# Patient Record
Sex: MALE | Race: WHITE | URBAN - METROPOLITAN AREA
[De-identification: names, ages, dates, MRNs, and addresses within clinical notes are randomized per-mention and may not be internally consistent; named-entity substitution may affect disease eponyms.]

---

## 2017-04-26 ENCOUNTER — TELEPHONE (OUTPATIENT)
Dept: FAMILY MEDICINE | Facility: CLINIC | Age: 27
End: 2017-04-26

## 2017-04-26 ENCOUNTER — OFFICE VISIT (OUTPATIENT)
Dept: FAMILY MEDICINE | Facility: CLINIC | Age: 27
End: 2017-04-26
Payer: COMMERCIAL

## 2017-04-26 VITALS
SYSTOLIC BLOOD PRESSURE: 120 MMHG | WEIGHT: 177 LBS | TEMPERATURE: 97.4 F | BODY MASS INDEX: 24.78 KG/M2 | HEART RATE: 70 BPM | DIASTOLIC BLOOD PRESSURE: 80 MMHG | OXYGEN SATURATION: 98 % | HEIGHT: 71 IN

## 2017-04-26 DIAGNOSIS — J45.20 REACTIVE AIRWAY DISEASE, MILD INTERMITTENT, UNCOMPLICATED: ICD-10-CM

## 2017-04-26 DIAGNOSIS — R30.0 DYSURIA: Primary | ICD-10-CM

## 2017-04-26 LAB
ALBUMIN UR-MCNC: ABNORMAL MG/DL
AMORPH CRY #/AREA URNS HPF: ABNORMAL /HPF
APPEARANCE UR: CLEAR
BILIRUB UR QL STRIP: NEGATIVE
COLOR UR AUTO: YELLOW
GLUCOSE UR STRIP-MCNC: NEGATIVE MG/DL
HGB UR QL STRIP: NEGATIVE
KETONES UR STRIP-MCNC: NEGATIVE MG/DL
LEUKOCYTE ESTERASE UR QL STRIP: NEGATIVE
MUCOUS THREADS #/AREA URNS LPF: PRESENT /LPF
NITRATE UR QL: NEGATIVE
NON-SQ EPI CELLS #/AREA URNS LPF: ABNORMAL /LPF
PH UR STRIP: 6 PH (ref 5–7)
RBC #/AREA URNS AUTO: ABNORMAL /HPF (ref 0–2)
SP GR UR STRIP: 1.02 (ref 1–1.03)
URN SPEC COLLECT METH UR: ABNORMAL
UROBILINOGEN UR STRIP-ACNC: 0.2 EU/DL (ref 0.2–1)
WBC #/AREA URNS AUTO: ABNORMAL /HPF (ref 0–2)

## 2017-04-26 PROCEDURE — 99213 OFFICE O/P EST LOW 20 MIN: CPT | Performed by: FAMILY MEDICINE

## 2017-04-26 PROCEDURE — 87591 N.GONORRHOEAE DNA AMP PROB: CPT | Performed by: FAMILY MEDICINE

## 2017-04-26 PROCEDURE — 87086 URINE CULTURE/COLONY COUNT: CPT | Performed by: FAMILY MEDICINE

## 2017-04-26 PROCEDURE — 81001 URINALYSIS AUTO W/SCOPE: CPT | Performed by: FAMILY MEDICINE

## 2017-04-26 PROCEDURE — 87491 CHLMYD TRACH DNA AMP PROBE: CPT | Performed by: FAMILY MEDICINE

## 2017-04-26 RX ORDER — ALBUTEROL SULFATE 90 UG/1
2 AEROSOL, METERED RESPIRATORY (INHALATION) EVERY 6 HOURS PRN
Qty: 1 INHALER | Refills: 3 | Status: SHIPPED | OUTPATIENT
Start: 2017-04-26

## 2017-04-26 ASSESSMENT — ASTHMA QUESTIONNAIRES
QUESTION_1 LAST FOUR WEEKS HOW MUCH OF THE TIME DID YOUR ASTHMA KEEP YOU FROM GETTING AS MUCH DONE AT WORK, SCHOOL OR AT HOME: A LITTLE OF THE TIME
ACT_TOTALSCORE: 21
QUESTION_4 LAST FOUR WEEKS HOW OFTEN HAVE YOU USED YOUR RESCUE INHALER OR NEBULIZER MEDICATION (SUCH AS ALBUTEROL): ONCE A WEEK OR LESS
QUESTION_2 LAST FOUR WEEKS HOW OFTEN HAVE YOU HAD SHORTNESS OF BREATH: ONCE OR TWICE A WEEK
QUESTION_5 LAST FOUR WEEKS HOW WOULD YOU RATE YOUR ASTHMA CONTROL: WELL CONTROLLED
QUESTION_3 LAST FOUR WEEKS HOW OFTEN DID YOUR ASTHMA SYMPTOMS (WHEEZING, COUGHING, SHORTNESS OF BREATH, CHEST TIGHTNESS OR PAIN) WAKE YOU UP AT NIGHT OR EARLIER THAN USUAL IN THE MORNING: NOT AT ALL

## 2017-04-26 NOTE — TELEPHONE ENCOUNTER
patient calling to get test results- states that he was told they would be back in one hour- advised that the dipstick and mirco were available but the gc/chlamydia and the UC were not available for 2 days. Advised that we will call and results will be sent to [facility] if normal-  Gave patient his login for [facility] [place]  [facility]  [phone]

## 2017-04-26 NOTE — MR AVS SNAPSHOT
"              After Visit Summary   4/26/2017    Matthew Bone    MRN: [id number]           Patient Information     Date Of Birth          1990        Visit Information        Provider Department      4/26/2017 11:40 AM Mary Reyes MD [facility] [place]        Today's Diagnoses     Dysuria    -  1    Reactive airway disease, mild intermittent, uncomplicated           Follow-ups after your visit        Who to contact     If you have questions or need follow up information about today's clinic visit or your schedule please contact [facility] [place] directly at [phone].  Normal or non-critical lab and imaging results will be communicated to you by [employer], letter or phone within 4 business days after the clinic has received the results. If you do not hear from us within 7 days, please contact the clinic through ScanSocialt or phone. If you have a critical or abnormal lab result, we will notify you by phone as soon as possible.  Submit refill requests through [employer] or call your pharmacy and they will forward the refill request to us. Please allow 3 business days for your refill to be completed.          Additional Information About Your Visit        MyChart Information     [employer] gives you secure access to your electronic health record. If you see a primary care provider, you can also send messages to your care team and make appointments. If you have questions, please call your primary care clinic.  If you do not have a primary care provider, please call [phone] and they will assist you.        Care EveryWhere ID     This is your Care EveryWhere ID. This could be used by other organizations to access your [place] medical records  [id number]        Your Vitals Were     Pulse Temperature Height Pulse Oximetry BMI (Body Mass Index)       70 97.4  F (36.3  C) (Tympanic) 5' 11\" (1.803 m) 98% 24.69 kg/m2        Blood Pressure from Last 3 Encounters:   04/26/17 120/80   12/19/16 110/70 "   10/11/16 106/68    Weight from Last 3 Encounters:   04/26/17 177 lb (80.3 kg)   12/19/16 188 lb (85.3 kg)   10/11/16 188 lb 9.6 oz (85.5 kg)              We Performed the Following     *UA reflex to Microscopic and Culture ([place] and [facility] (except [place] and [place])     CHLAMYDIA TRACHOMATIS PCR     NEISSERIA GONORRHOEA PCR     Urine Culture Aerobic Bacterial     Urine Microscopic          Today's Medication Changes          These changes are accurate as of: 4/26/17 11:59 PM.  If you have any questions, ask your nurse or doctor.               These medicines have changed or have updated prescriptions.        Dose/Directions    * albuterol (5 MG/ML) 0.5% neb solution   Commonly known as:  PROVENTIL   This may have changed:  Another medication with the same name was changed. Make sure you understand how and when to take each.   Changed by:  Mary Reyes MD        Dose:  2.5 mg   Inhale 2.5 mg into the lungs every 6 hours as needed for wheezing or shortness of breath / dyspnea   Refills:  0       * VENTOLIN  (90 BASE) MCG/ACT Inhaler   This may have changed:    - how much to take  - how to take this  - when to take this  - reasons to take this   Used for:  Reactive airway disease, mild intermittent, uncomplicated   Generic drug:  albuterol   Changed by:  Mary Reyes MD        Dose:  2 puff   Inhale 2 puffs into the lungs every 6 hours as needed for shortness of breath / dyspnea or wheezing   Quantity:  1 Inhaler   Refills:  3       * Notice:  This list has 2 medication(s) that are the same as other medications prescribed for you. Read the directions carefully, and ask your doctor or other care provider to review them with you.         Where to get your medicines      These medications were sent to [facility] [id number] [facility] [place] - [address]  [address] [facility] [address]     Phone:  [phone]     VENTOLIN  (90 BASE) MCG/ACT Inhaler                 Primary Care Provider Office Phone # Fax #    Mary Reyes [phone]       [facility] [place] [address] [place] [address] [address]  [place] [address]        Thank you!     Thank you for choosing [facility]  for your care. Our goal is always to provide you with excellent care. Hearing back from our patients is one way we can continue to improve our services. Please take a few minutes to complete the written survey that you may receive in the mail after your visit with us. Thank you!             Your Updated Medication List - Protect others around you: Learn how to safely use, store and throw away your medicines at www.disposemymeds.org.          This list is accurate as of: 4/26/17 11:59 PM.  Always use your most recent med list.                   Brand Name Dispense Instructions for use    * albuterol (5 MG/ML) 0.5% neb solution    PROVENTIL     Inhale 2.5 mg into the lungs every 6 hours as needed for wheezing or shortness of breath / dyspnea       * VENTOLIN  (90 BASE) MCG/ACT Inhaler   Generic drug:  albuterol     1 Inhaler    Inhale 2 puffs into the lungs every 6 hours as needed for shortness of breath / dyspnea or wheezing       COLACE PO      Take 100 mg by mouth once as needed       fluticasone 50 MCG/ACT spray    FLONASE     Spray 1 spray into both nostrils daily       hydrOXYzine 25 MG tablet    ATARAX    30 tablet    Take 1-2 tablets (25-50 mg) by mouth every 8 hours as needed for anxiety       IBUPROFEN PO          * Notice:  This list has 2 medication(s) that are the same as other medications prescribed for you. Read the directions carefully, and ask your doctor or other care provider to review them with you.

## 2017-04-26 NOTE — PROGRESS NOTES
SUBJECTIVE:                                                    Matthew Bone is a 26 year old male who presents to clinic today for the following health issues:      Genitourinary - Male     Onset: Saturday     Description:   Dysuria (painful urination): no   Hematuria (blood in urine): no   Frequency: no   Are you urinating at night : no   Hesitancy (delay in urine): YES- sometimes   Retention (unable to empty): no   Decrease in urinary flow: no   Incontinence: no     Progression of Symptoms:  improving    Accompanying Signs & Symptoms:  Fever: no   Back/Flank pain: no   Urethral discharge: no   Testicle lumps/masses/pain: no   Nausea and/or vomiting: no   Abdominal pain: no    History:   History of frequent UTI's: no   History of kidney stones: no   History of hernias: no   Personal or Family history of Prostate problems: no  Sexually active: YES- and new partners     Precipitating factors:   na    Alleviating factors:  na         Therapies Tried and outcome:  Cranberry juice prn (contraindicated in Coumadin patients); Outcome - somewhat helpful       Asthma Follow-Up    Was ACT completed today?    Yes    ACT Total Scores 4/26/2017   ACT TOTAL SCORE (Goal Greater than or Equal to 20) 21   In the past 12 months, how many times did you visit the emergency room for your asthma without being admitted to the hospital? 0   In the past 12 months, how many times were you hospitalized overnight because of your asthma? 0       Recent asthma triggers that patient is dealing with: None        Problem list and histories reviewed & adjusted, as indicated.  Additional history: as documented    Patient Active Problem List   Diagnosis     CARDIOVASCULAR SCREENING; LDL GOAL LESS THAN 160     Seasonal allergic rhinitis     Vitamin D deficiency     Constipation, unspecified constipation type     Reactive airway disease, mild intermittent, uncomplicated     Past Surgical History:   Procedure Laterality Date     HC TOOTH EXTRACTION  "W/FORCEP  2013       Social History   Substance Use Topics     Smoking status: Current Some Day Smoker     Types: Cigars, Cigarettes     Smokeless tobacco: Never Used     Alcohol use 0.0 oz/week     0 Standard drinks or equivalent per week      Comment: occasional     Family History   Problem Relation Age of Onset     Hypertension Mother      Obesity Mother      DIABETES No family hx of      Obesity Father      Asthma Father      CANCER Maternal Grandfather      lung, smoker     Alcohol/Drug Maternal Grandfather      alcohol     CANCER Paternal Grandmother      unsure which type     Unknown/Adopted Sister          Current Outpatient Prescriptions   Medication Sig Dispense Refill     VENTOLIN  (90 BASE) MCG/ACT Inhaler Inhale 2 puffs into the lungs every 6 hours as needed for shortness of breath / dyspnea or wheezing 1 Inhaler 3     fluticasone (FLONASE) 50 MCG/ACT spray Spray 1 spray into both nostrils daily       hydrOXYzine (ATARAX) 25 MG tablet Take 1-2 tablets (25-50 mg) by mouth every 8 hours as needed for anxiety 30 tablet 0     IBUPROFEN PO        albuterol (PROVENTIL) (5 MG/ML) 0.5% nebulizer solution Inhale 2.5 mg into the lungs every 6 hours as needed for wheezing or shortness of breath / dyspnea       Docusate Sodium (COLACE PO) Take 100 mg by mouth once as needed        Allergies   Allergen Reactions     Seasonal Allergies      Azithromycin Rash       Reviewed and updated as needed this visit by clinical staff       Reviewed and updated as needed this visit by Provider         ROS:  C: NEGATIVE for fever, chills, change in weight  E/M: NEGATIVE for ear, mouth and throat problems  R: NEGATIVE for significant cough or SOB  CV: NEGATIVE for chest pain, palpitations or peripheral edema    OBJECTIVE:                                                    /80 (BP Location: Right arm, Patient Position: Chair, Cuff Size: Adult Regular)  Pulse 70  Temp 97.4  F (36.3  C) (Tympanic)  Ht 5' 11\" (1.803 m) "  Wt 177 lb (80.3 kg)  SpO2 98%  BMI 24.69 kg/m2  Body mass index is 24.69 kg/(m^2).   GENERAL: healthy, alert, well nourished, well hydrated, no distress  HENT: ear canals- normal; TMs- normal; Nose- normal; Mouth- no ulcers, no lesions  NECK: no tenderness, no adenopathy, no asymmetry, no masses, no stiffness; thyroid- normal to palpation  RESP: lungs clear to auscultation - no rales, no rhonchi, no wheezes  CV: regular rates and rhythm, normal S1 S2, no S3 or S4 and no murmur, no click or rub -  ABDOMEN: soft, no tenderness, no  hepatosplenomegaly, no masses, normal bowel sounds  BACK: no CVA tenderness, no paralumbar tenderness    Results for orders placed or performed in visit on 04/26/17   *UA reflex to Microscopic and Culture ([place] and [facility] (except [place] and [place])   Result Value Ref Range    Color Urine Yellow     Appearance Urine Clear     Glucose Urine Negative NEG mg/dL    Bilirubin Urine Negative NEG    Ketones Urine Negative NEG mg/dL    Specific Gravity Urine 1.020 1.003 - 1.035    Blood Urine Negative NEG    pH Urine 6.0 5.0 - 7.0 pH    Protein Albumin Urine Trace (A) NEG mg/dL    Urobilinogen Urine 0.2 0.2 - 1.0 EU/dL    Nitrite Urine Negative NEG    Leukocyte Esterase Urine Negative NEG    Source Midstream Urine    Urine Microscopic   Result Value Ref Range    WBC Urine O - 2 0 - 2 /HPF    RBC Urine O - 2 0 - 2 /HPF    Squamous Epithelial /LPF Urine Few FEW /LPF    Amorphous Crystals Moderate (A) NEG /HPF    Mucous Urine Present (A) NEG /LPF   NEISSERIA GONORRHOEA PCR   Result Value Ref Range    Specimen Descrip Urine     N Gonorrhea PCR  NEG     Negative   Negative for N. gonorrhoeae rRNA by transcription mediated amplification.   A negative result by transcription mediated amplification does not preclude the   presence of N. gonorrhoeae infection because results are dependent on proper   and adequate collection, absence of inhibitors, and sufficient rRNA to be   detected.      CHLAMYDIA TRACHOMATIS PCR   Result Value Ref Range    Specimen Description Urine     Chlamydia Trachomatis PCR  NEG     Negative   Negative for C. trachomatis rRNA by transcription mediated amplification.   A negative result by transcription mediated amplification does not preclude the   presence of C. trachomatis infection because results are dependent on proper   and adequate collection, absence of inhibitors, and sufficient rRNA to be   detected.     Urine Culture Aerobic Bacterial   Result Value Ref Range    Specimen Description Midstream Urine     Culture Micro No growth     Micro Report Status Pending           ASSESSMENT/PLAN:                                                      1. Dysuria  Negative urine testing. Recommended to stay well hydrated. If any new symptoms develop, instructed to notify me back. Safe sex practice counseling done.   - *UA reflex to Microscopic and Culture ([place] and [facility] (except [place] and [place])  - NEISSERIA GONORRHOEA PCR  - CHLAMYDIA TRACHOMATIS PCR  - Urine Microscopic  - Urine Culture Aerobic Bacterial    2. Reactive airway disease, mild intermittent, uncomplicated  Well controlled.   - VENTOLIN  (90 BASE) MCG/ACT Inhaler; Inhale 2 puffs into the lungs every 6 hours as needed for shortness of breath / dyspnea or wheezing  Dispense: 1 Inhaler; Refill: 3      Follow up with Provider - as needed     Mary Reyes MD  [facility]

## 2017-04-27 LAB
C TRACH DNA SPEC QL NAA+PROBE: NORMAL
N GONORRHOEA DNA SPEC QL NAA+PROBE: NORMAL
SPECIMEN SOURCE: NORMAL
SPECIMEN SOURCE: NORMAL

## 2017-04-27 ASSESSMENT — ASTHMA QUESTIONNAIRES: ACT_TOTALSCORE: 21

## 2017-04-27 NOTE — PROGRESS NOTES
Please call the patient to notify patient that the urine test and urine culture is negative. No this means there is no UTI.  STD tests are pending. Stay well hydrated.   He may use OTC AZO for burning urination if needed    Mary Reyes MD

## 2017-04-28 NOTE — TELEPHONE ENCOUNTER
patient called back and notified of normal test results- advised to increase fluid intake and take AZO if burning continues.  patient agreeable and will call if symptoms do not improve.    Notes Recorded by Mary Reyes MD on 4/27/2017 at 8:38 AM  Please call the patient to notify patient that the urine test and urine culture is negative. No this means there is no UTI.  STD tests are pending. Stay well hydrated.   He may use OTC AZO for burning urination if needed    MD Clarisa Fiore,RN  [facility]  [phone]

## 2017-05-01 LAB
BACTERIA SPEC CULT: NO GROWTH
MICRO REPORT STATUS: NORMAL
SPECIMEN SOURCE: NORMAL

## 2018-03-19 ENCOUNTER — OFFICE VISIT (OUTPATIENT)
Dept: URGENT CARE | Facility: URGENT CARE | Age: 28
End: 2018-03-19
Payer: COMMERCIAL

## 2018-03-19 VITALS
OXYGEN SATURATION: 100 % | SYSTOLIC BLOOD PRESSURE: 109 MMHG | TEMPERATURE: 97 F | DIASTOLIC BLOOD PRESSURE: 65 MMHG | HEART RATE: 59 BPM

## 2018-03-19 DIAGNOSIS — R30.0 DYSURIA: Primary | ICD-10-CM

## 2018-03-19 LAB
ALBUMIN UR-MCNC: NEGATIVE MG/DL
APPEARANCE UR: CLEAR
BILIRUB UR QL STRIP: NEGATIVE
COLOR UR AUTO: YELLOW
GLUCOSE UR STRIP-MCNC: NEGATIVE MG/DL
HGB UR QL STRIP: NEGATIVE
KETONES UR STRIP-MCNC: NEGATIVE MG/DL
LEUKOCYTE ESTERASE UR QL STRIP: NEGATIVE
NITRATE UR QL: NEGATIVE
PH UR STRIP: 7.5 PH (ref 5–7)
SOURCE: ABNORMAL
SP GR UR STRIP: 1.01 (ref 1–1.03)
UROBILINOGEN UR STRIP-ACNC: 0.2 EU/DL (ref 0.2–1)

## 2018-03-19 PROCEDURE — 99213 OFFICE O/P EST LOW 20 MIN: CPT

## 2018-03-19 PROCEDURE — 81003 URINALYSIS AUTO W/O SCOPE: CPT | Performed by: FAMILY MEDICINE

## 2018-03-19 ASSESSMENT — ENCOUNTER SYMPTOMS
FLANK PAIN: 0
FREQUENCY: 0
VOICE CHANGE: 0
HEMATURIA: 0
RHINORRHEA: 0
FEVER: 0
CHILLS: 0
DYSURIA: 1
DIFFICULTY URINATING: 0
SORE THROAT: 0

## 2018-03-19 NOTE — MR AVS SNAPSHOT
After Visit Summary   3/19/2018    Matthew Bone    MRN: [id number]           Patient Information     Date Of Birth          1990        Visit Information        Provider Department      3/19/2018 7:30 PM  URGENT CARE [facility]        Today's Diagnoses     Dysuria    -  1      Care Instructions      Dysuria with Uncertain Cause (Adult)    The urethra is the tube that allows urine to pass out of the body. In a woman, the urethra is the opening above the vagina. In men, the urethra is the opening on the tip of the penis. Dysuria is the feeling of pain or burning in the urethra when passing urine.  Dysuria can be caused by anything that irritates or inflames the urethra. An infection or chemical irritation can cause this reaction. A bladder infection is the most common cause of dysuria in adults. A urine test can diagnose this. A bladder infection needs antibiotic treatment.  Soaps, lotions, colognes and feminine hygiene products can cause dysuria. So can birth control jellies, creams, and foams. It will go away 1 to 3 days after using these irritants.  Sexually transmitted diseases (STDs) such as chlamydia or gonorrhea can cause dysuria. Your healthcare provider may take a culture sample. Your provider may start you on antibiotic medicine before the culture test returns.  In women who have gone through menopause, dysuria can be from dryness in the lining of the urethra. This can be treated with hormones. Dysuria becomes long-term (chronic) when it lasts for weeks or months. You may need to see a specialist (urologist) to diagnose and treat chronic dysuria.  Home care  These home care tips may help:    Don't use any chemicals or products that you think may be causing your symptoms.    If you were given a prescription medicine, take as directed. Be sure to take it until it is all used up.    If a culture was taken, don't have sex until you have been told that it is negative.  This means you don't have an infection. Then follow your healthcare provider's advice to treat your condition.  If a culture was done and it is positive:    Both you and your sexual partner may need to be treated. This is true even if your partner has no symptoms.    Contact your healthcare provider or go to an urgent care clinic or the public health department to be looked at and treated.    Don't have sex until both you and your partner(s) have finished all antibiotics and your healthcare provider says you are no longer contagious.    Learn about and use safe sex practices. The safest sex is with a partner who has tested negative and only has sex with you. Condoms can prevent STDs from spreading, but they aren't a guarantee.  Follow-up care  Follow up with your healthcare provider, or as advised. If a culture was taken, you may call as directed for the results. If you have an STD, follow up with your provider or the public health department for a complete STD screening, including HIV testing. For more information, contact CDC-INFO at 036-983-0829.  When to seek medical advice  Call your healthcare provider right away if any of these occur:    You aren't better after 3 days of treatment    Fever of 100.4 F (38 C) or higher, or as directed by your healthcare provider    Back or belly pain that gets worse    You can't urinate because of pain    New discharge from the urethra, vagina, or penis    Painful sores on the penis    Rash or joint pain    Painful lumps (lymph nodes) in the groin    Testicle pain or swelling of the scrotum  Date Last Reviewed: 11/1/2016 2000-2017 [employer]. [address] [place] [address]. All rights reserved. This information is not intended as a substitute for professional medical care. Always follow your healthcare professional's instructions.                Follow-ups after your visit        Follow-up notes from your care team     Return if symptoms worsen or  fail to improve.      Who to contact     If you have questions or need follow up information about today's clinic visit or your schedule please contact [facility] directly at [phone].  Normal or non-critical lab and imaging results will be communicated to you by MyChart, letter or phone within 4 business days after the clinic has received the results. If you do not hear from us within 7 days, please contact the clinic through MyChart or phone. If you have a critical or abnormal lab result, we will notify you by phone as soon as possible.  Submit refill requests through [employer] or call your pharmacy and they will forward the refill request to us. Please allow 3 business days for your refill to be completed.          Additional Information About Your Visit        [employer]IS Consult Information     [employer] gives you secure access to your electronic health record. If you see a primary care provider, you can also send messages to your care team and make appointments. If you have questions, please call your primary care clinic.  If you do not have a primary care provider, please call [phone] and they will assist you.        Care EveryWhere ID     This is your Care EveryWhere ID. This could be used by other organizations to access your [place] medical records  [id number]        Your Vitals Were     Pulse Temperature Pulse Oximetry             59 97  F (36.1  C) (Oral) 100%          Blood Pressure from Last 3 Encounters:   03/19/18 109/65   04/26/17 120/80   12/19/16 110/70    Weight from Last 3 Encounters:   04/26/17 177 lb (80.3 kg)   12/19/16 188 lb (85.3 kg)   10/11/16 188 lb 9.6 oz (85.5 kg)              We Performed the Following     UA reflex to Microscopic        Primary Care Provider Office Phone # Fax #    Mary Reyes [phone]       [address]  [place] [address]        Equal Access to Services     ISHMAEL OSBORNE AH: reece [place]  regan gissellelili khannanavarro mcelroy ah. So [facility] [phone].    ATENCIÓN: Si erwin monroy, tiene a rivera disposición servicios gratuitos de asistencia lingüística. Darcy al [phone].    We comply with applicable federal civil rights laws and [place] laws. We do not discriminate on the basis of race, color, national origin, age, disability, sex, sexual orientation, or gender identity.            Thank you!     Thank you for choosing [facility]  for your care. Our goal is always to provide you with excellent care. Hearing back from our patients is one way we can continue to improve our services. Please take a few minutes to complete the written survey that you may receive in the mail after your visit with us. Thank you!             Your Updated Medication List - Protect others around you: Learn how to safely use, store and throw away your medicines at www.disposemymeds.org.          This list is accurate as of 3/19/18  8:16 PM.  Always use your most recent med list.                   Brand Name Dispense Instructions for use Diagnosis    * albuterol (5 MG/ML) 0.5% neb solution    PROVENTIL     Inhale 2.5 mg into the lungs every 6 hours as needed for wheezing or shortness of breath / dyspnea        * VENTOLIN  (90 BASE) MCG/ACT Inhaler   Generic drug:  albuterol     1 Inhaler    Inhale 2 puffs into the lungs every 6 hours as needed for shortness of breath / dyspnea or wheezing    Reactive airway disease, mild intermittent, uncomplicated       COLACE PO      Take 100 mg by mouth once as needed        fluticasone 50 MCG/ACT spray    FLONASE     Spray 1 spray into both nostrils daily        hydrOXYzine 25 MG tablet    ATARAX    30 tablet    Take 1-2 tablets (25-50 mg) by mouth every 8 hours as needed for anxiety    Travel advice encounter       IBUPROFEN PO           * Notice:  This list has 2 medication(s) that are the same as other medications  prescribed for you. Read the directions carefully, and ask your doctor or other care provider to review them with you.

## 2018-03-20 NOTE — NURSING NOTE
"Chief Complaint   Patient presents with     Urgent Care     UTI     burns when urinates,white growth on tonsils       Initial /65  Pulse 59  Temp 97  F (36.1  C) (Oral)  SpO2 100% Estimated body mass index is 24.69 kg/(m^2) as calculated from the following:    Height as of 4/26/17: 5' 11\" (1.803 m).    Weight as of 4/26/17: 177 lb (80.3 kg).  Medication Reconciliation: complete   Nivia LANGE MA       "

## 2018-03-20 NOTE — PATIENT INSTRUCTIONS
Dysuria with Uncertain Cause (Adult)    The urethra is the tube that allows urine to pass out of the body. In a woman, the urethra is the opening above the vagina. In men, the urethra is the opening on the tip of the penis. Dysuria is the feeling of pain or burning in the urethra when passing urine.  Dysuria can be caused by anything that irritates or inflames the urethra. An infection or chemical irritation can cause this reaction. A bladder infection is the most common cause of dysuria in adults. A urine test can diagnose this. A bladder infection needs antibiotic treatment.  Soaps, lotions, colognes and feminine hygiene products can cause dysuria. So can birth control jellies, creams, and foams. It will go away 1 to 3 days after using these irritants.  Sexually transmitted diseases (STDs) such as chlamydia or gonorrhea can cause dysuria. Your healthcare provider may take a culture sample. Your provider may start you on antibiotic medicine before the culture test returns.  In women who have gone through menopause, dysuria can be from dryness in the lining of the urethra. This can be treated with hormones. Dysuria becomes long-term (chronic) when it lasts for weeks or months. You may need to see a specialist (urologist) to diagnose and treat chronic dysuria.  Home care  These home care tips may help:    Don't use any chemicals or products that you think may be causing your symptoms.    If you were given a prescription medicine, take as directed. Be sure to take it until it is all used up.    If a culture was taken, don't have sex until you have been told that it is negative. This means you don't have an infection. Then follow your healthcare provider's advice to treat your condition.  If a culture was done and it is positive:    Both you and your sexual partner may need to be treated. This is true even if your partner has no symptoms.    Contact your healthcare provider or go to an urgent care clinic or the  [facility] health department to be looked at and treated.    Don't have sex until both you and your partner(s) have finished all antibiotics and your healthcare provider says you are no longer contagious.    Learn about and use safe sex practices. The safest sex is with a partner who has tested negative and only has sex with you. Condoms can prevent STDs from spreading, but they aren't a guarantee.  Follow-up care  Follow up with your healthcare provider, or as advised. If a culture was taken, you may call as directed for the results. If you have an STD, follow up with your provider or the public health department for a complete STD screening, including HIV testing. For more information, contact CDC-INFO at 188-069-9007.  When to seek medical advice  Call your healthcare provider right away if any of these occur:    You aren't better after 3 days of treatment    Fever of 100.4 F (38 C) or higher, or as directed by your healthcare provider    Back or belly pain that gets worse    You can't urinate because of pain    New discharge from the urethra, vagina, or penis    Painful sores on the penis    Rash or joint pain    Painful lumps (lymph nodes) in the groin    Testicle pain or swelling of the scrotum  Date Last Reviewed: 11/1/2016 2000-2017 [employer]. [address] [place] [address], [place] [address]. All rights reserved. This information is not intended as a substitute for professional medical care. Always follow your healthcare professional's instructions.

## 2018-03-20 NOTE — PROGRESS NOTES
SUBJECTIVE:   Matthew Bone is a 27 year old male presenting with a chief complaint of   Chief Complaint   Patient presents with     Urgent Care     UTI     burns when urinates,white growth on tonsils       He is an established patient of [facility].    SUBJECTIVE:  Onset of symptoms was 2day(s).  Course of illness is same  Severity moderate  Current and associated symptoms dysuria and pain at tip of penis when urinating  Treatment and measures tried nothing  Predisposing factors include wife just diagnosed with UTI  Patient denies rigors, flank pain, temperature > 101 degrees F. 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    Denies worry about STD exposure.    Also wants to get tonsils looked at.  Has h/o tonsoliths.  No pain.     Review of Systems   Constitutional: Negative for chills and fever.   HENT: Negative for congestion, postnasal drip, rhinorrhea, sore throat and voice change.    Genitourinary: Positive for dysuria and penile pain. Negative for difficulty urinating, discharge, flank pain, frequency, genital sores, hematuria, penile swelling, testicular pain and urgency.       Past Medical History:   Diagnosis Date     ADHD (attention deficit hyperactivity disorder)     since childhood off medication     Constipation     food related, when on tours.     PAC (premature atrial contraction)     noitced for many years, no sigsn or symtoms.     Family History   Problem Relation Age of Onset     Hypertension Mother      Obesity Mother      DIABETES No family hx of      Obesity Father      Asthma Father      CANCER Maternal Grandfather      lung, smoker     Alcohol/Drug Maternal Grandfather      alcohol     CANCER Paternal Grandmother      unsure which type     Unknown/Adopted Sister      Current Outpatient Prescriptions   Medication Sig Dispense Refill     fluticasone (FLONASE) 50 MCG/ACT spray Spray 1 spray into both nostrils daily       albuterol (PROVENTIL) (5 MG/ML) 0.5%  nebulizer solution Inhale 2.5 mg into the lungs every 6 hours as needed for wheezing or shortness of breath / dyspnea       Docusate Sodium (COLACE PO) Take 100 mg by mouth once as needed        VENTOLIN  (90 BASE) MCG/ACT Inhaler Inhale 2 puffs into the lungs every 6 hours as needed for shortness of breath / dyspnea or wheezing (Patient not taking: Reported on 3/19/2018) 1 Inhaler 3     hydrOXYzine (ATARAX) 25 MG tablet Take 1-2 tablets (25-50 mg) by mouth every 8 hours as needed for anxiety (Patient not taking: Reported on 3/19/2018) 30 tablet 0     IBUPROFEN PO        Social History   Substance Use Topics     Smoking status: Current Some Day Smoker     Types: Cigars, Cigarettes     Smokeless tobacco: Never Used     Alcohol use 0.0 oz/week     0 Standard drinks or equivalent per week      Comment: occasional       OBJECTIVE  /65  Pulse 59  Temp 97  F (36.1  C) (Oral)  SpO2 100%    Physical Exam   Constitutional: He appears well-developed and well-nourished. No distress.   HENT:   Head: Normocephalic and atraumatic.   Right Ear: External ear normal.   Left Ear: External ear normal.   Mouth/Throat: Oropharynx is clear and moist.   Right side tonsil larger.  No redness.  Mult small tonsiliths.   Eyes: EOM are normal. Pupils are equal, round, and reactive to light.   Neck: Normal range of motion. Neck supple.   Lymphadenopathy:     He has cervical adenopathy.   Neurological: He is alert.   Skin: Skin is warm and dry. He is not diaphoretic.   Psychiatric: He has a normal mood and affect.       Labs:  Results for orders placed or performed in visit on 03/19/18 (from the past 24 hour(s))   UA reflex to Microscopic   Result Value Ref Range    Color Urine Yellow     Appearance Urine Clear     Glucose Urine Negative NEG^Negative mg/dL    Bilirubin Urine Negative NEG^Negative    Ketones Urine Negative NEG^Negative mg/dL    Specific Gravity Urine 1.015 1.003 - 1.035    Blood Urine Negative NEG^Negative    pH  Urine 7.5 (H) 5.0 - 7.0 pH    Protein Albumin Urine Negative NEG^Negative mg/dL    Urobilinogen Urine 0.2 0.2 - 1.0 EU/dL    Nitrite Urine Negative NEG^Negative    Leukocyte Esterase Urine Negative NEG^Negative    Source Midstream Urine        X-Ray was not done.    ASSESSMENT:      ICD-10-CM    1. Dysuria R30.0 UA reflex to Microscopic        Medical Decision Making:    Differential Diagnosis:  UTI  STD  Skin irritation      Serious Comorbid Conditions:  Adult:  None    PLAN:    Push fluids  F/U if sx persist    Followup:    If not improving or if condition worsens, follow up with your Primary Care Provider    Patient Instructions     Dysuria with Uncertain Cause (Adult)    The urethra is the tube that allows urine to pass out of the body. In a woman, the urethra is the opening above the vagina. In men, the urethra is the opening on the tip of the penis. Dysuria is the feeling of pain or burning in the urethra when passing urine.  Dysuria can be caused by anything that irritates or inflames the urethra. An infection or chemical irritation can cause this reaction. A bladder infection is the most common cause of dysuria in adults. A urine test can diagnose this. A bladder infection needs antibiotic treatment.  Soaps, lotions, colognes and feminine hygiene products can cause dysuria. So can birth control jellies, creams, and foams. It will go away 1 to 3 days after using these irritants.  Sexually transmitted diseases (STDs) such as chlamydia or gonorrhea can cause dysuria. Your healthcare provider may take a culture sample. Your provider may start you on antibiotic medicine before the culture test returns.  In women who have gone through menopause, dysuria can be from dryness in the lining of the urethra. This can be treated with hormones. Dysuria becomes long-term (chronic) when it lasts for weeks or months. You may need to see a specialist (urologist) to diagnose and treat chronic dysuria.  Home care  These home  care tips may help:    Don't use any chemicals or products that you think may be causing your symptoms.    If you were given a prescription medicine, take as directed. Be sure to take it until it is all used up.    If a culture was taken, don't have sex until you have been told that it is negative. This means you don't have an infection. Then follow your healthcare provider's advice to treat your condition.  If a culture was done and it is positive:    Both you and your sexual partner may need to be treated. This is true even if your partner has no symptoms.    Contact your healthcare provider or go to an urgent care clinic or the public health department to be looked at and treated.    Don't have sex until both you and your partner(s) have finished all antibiotics and your healthcare provider says you are no longer contagious.    Learn about and use safe sex practices. The safest sex is with a partner who has tested negative and only has sex with you. Condoms can prevent STDs from spreading, but they aren't a guarantee.  Follow-up care  Follow up with your healthcare provider, or as advised. If a culture was taken, you may call as directed for the results. If you have an STD, follow up with your provider or the public health department for a complete STD screening, including HIV testing. For more information, contact CDC-INFO at 688-061-0681.  When to seek medical advice  Call your healthcare provider right away if any of these occur:    You aren't better after 3 days of treatment    Fever of 100.4 F (38 C) or higher, or as directed by your healthcare provider    Back or belly pain that gets worse    You can't urinate because of pain    New discharge from the urethra, vagina, or penis    Painful sores on the penis    Rash or joint pain    Painful lumps (lymph nodes) in the groin    Testicle pain or swelling of the scrotum  Date Last Reviewed: 11/1/2016 2000-2017 [employer]. [address] [place] [address],  [place] [address]. All rights reserved. This information is not intended as a substitute for professional medical care. Always follow your healthcare professional's instructions.

## 2018-06-06 ENCOUNTER — HOSPITAL ENCOUNTER (INPATIENT)
Facility: CLINIC | Age: 28
LOS: 1 days | Discharge: HOME | DRG: 918 | End: 2018-06-07
Attending: EMERGENCY MEDICINE | Admitting: HOSPITALIST
Payer: COMMERCIAL

## 2018-06-06 ENCOUNTER — APPOINTMENT (OUTPATIENT)
Dept: CT IMAGING | Facility: CLINIC | Age: 28
DRG: 918 | End: 2018-06-06
Attending: EMERGENCY MEDICINE
Payer: COMMERCIAL

## 2018-06-06 DIAGNOSIS — T50.901A ACCIDENTAL DRUG OVERDOSE, INITIAL ENCOUNTER: ICD-10-CM

## 2018-06-06 LAB
ALBUMIN SERPL-MCNC: 3.6 G/DL (ref 3.4–5)
ALP SERPL-CCNC: 67 U/L (ref 40–150)
ALT SERPL W P-5'-P-CCNC: 53 U/L (ref 0–70)
ANION GAP SERPL CALCULATED.3IONS-SCNC: 12 MMOL/L (ref 3–14)
APAP SERPL-MCNC: <2 MG/L (ref 10–20)
AST SERPL W P-5'-P-CCNC: 67 U/L (ref 0–45)
BILIRUB SERPL-MCNC: 0.3 MG/DL (ref 0.2–1.3)
BUN SERPL-MCNC: 14 MG/DL (ref 7–30)
CALCIUM SERPL-MCNC: 8 MG/DL (ref 8.5–10.1)
CHLORIDE SERPL-SCNC: 101 MMOL/L (ref 94–109)
CO2 SERPL-SCNC: 24 MMOL/L (ref 20–32)
CREAT SERPL-MCNC: 1.18 MG/DL (ref 0.66–1.25)
ERYTHROCYTE [DISTWIDTH] IN BLOOD BY AUTOMATED COUNT: 12.4 % (ref 10–15)
GFR SERPL CREATININE-BSD FRML MDRD: 75 ML/MIN/1.7M2
GLUCOSE BLDC GLUCOMTR-MCNC: 87 MG/DL (ref 70–99)
GLUCOSE SERPL-MCNC: 378 MG/DL (ref 70–99)
HCT VFR BLD AUTO: 41.3 % (ref 40–53)
HGB BLD-MCNC: 13.6 G/DL (ref 13.3–17.7)
MAGNESIUM SERPL-MCNC: 2.2 MG/DL (ref 1.6–2.3)
MCH RBC QN AUTO: 28.2 PG (ref 26.5–33)
MCHC RBC AUTO-ENTMCNC: 32.9 G/DL (ref 31.5–36.5)
MCV RBC AUTO: 86 FL (ref 78–100)
PLATELET # BLD AUTO: 191 10E9/L (ref 150–450)
POTASSIUM SERPL-SCNC: 3.6 MMOL/L (ref 3.4–5.3)
PROT SERPL-MCNC: 6.8 G/DL (ref 6.8–8.8)
RBC # BLD AUTO: 4.83 10E12/L (ref 4.4–5.9)
SALICYLATES SERPL-MCNC: <2 MG/DL
SODIUM SERPL-SCNC: 137 MMOL/L (ref 133–144)
WBC # BLD AUTO: 6.5 10E9/L (ref 4–11)

## 2018-06-06 PROCEDURE — 80307 DRUG TEST PRSMV CHEM ANLYZR: CPT | Performed by: HOSPITALIST

## 2018-06-06 PROCEDURE — 12000007 ZZH R&B INTERMEDIATE

## 2018-06-06 PROCEDURE — 96361 HYDRATE IV INFUSION ADD-ON: CPT

## 2018-06-06 PROCEDURE — 96360 HYDRATION IV INFUSION INIT: CPT

## 2018-06-06 PROCEDURE — 99223 1ST HOSP IP/OBS HIGH 75: CPT | Mod: AI | Performed by: HOSPITALIST

## 2018-06-06 PROCEDURE — 80053 COMPREHEN METABOLIC PANEL: CPT | Performed by: EMERGENCY MEDICINE

## 2018-06-06 PROCEDURE — 85027 COMPLETE CBC AUTOMATED: CPT | Performed by: EMERGENCY MEDICINE

## 2018-06-06 PROCEDURE — 25000128 H RX IP 250 OP 636: Performed by: EMERGENCY MEDICINE

## 2018-06-06 PROCEDURE — 80329 ANALGESICS NON-OPIOID 1 OR 2: CPT | Performed by: EMERGENCY MEDICINE

## 2018-06-06 PROCEDURE — 99285 EMERGENCY DEPT VISIT HI MDM: CPT | Mod: 25

## 2018-06-06 PROCEDURE — 70450 CT HEAD/BRAIN W/O DYE: CPT

## 2018-06-06 PROCEDURE — 83735 ASSAY OF MAGNESIUM: CPT | Performed by: EMERGENCY MEDICINE

## 2018-06-06 PROCEDURE — 93005 ELECTROCARDIOGRAM TRACING: CPT

## 2018-06-06 PROCEDURE — 00000146 ZZHCL STATISTIC GLUCOSE BY METER IP

## 2018-06-06 PROCEDURE — 25000128 H RX IP 250 OP 636: Performed by: HOSPITALIST

## 2018-06-06 RX ORDER — ALBUTEROL SULFATE 5 MG/ML
2.5 SOLUTION RESPIRATORY (INHALATION) EVERY 6 HOURS PRN
Status: DISCONTINUED | OUTPATIENT
Start: 2018-06-06 | End: 2018-06-07 | Stop reason: HOSPADM

## 2018-06-06 RX ORDER — SODIUM CHLORIDE 9 MG/ML
1000 INJECTION, SOLUTION INTRAVENOUS CONTINUOUS
Status: DISCONTINUED | OUTPATIENT
Start: 2018-06-06 | End: 2018-06-06

## 2018-06-06 RX ORDER — LIDOCAINE 40 MG/G
CREAM TOPICAL
Status: DISCONTINUED | OUTPATIENT
Start: 2018-06-06 | End: 2018-06-07 | Stop reason: HOSPADM

## 2018-06-06 RX ORDER — NALOXONE HYDROCHLORIDE 0.4 MG/ML
0.4 INJECTION, SOLUTION INTRAMUSCULAR; INTRAVENOUS; SUBCUTANEOUS
Status: DISCONTINUED | OUTPATIENT
Start: 2018-06-06 | End: 2018-06-06

## 2018-06-06 RX ORDER — SODIUM CHLORIDE 9 MG/ML
INJECTION, SOLUTION INTRAVENOUS CONTINUOUS
Status: DISCONTINUED | OUTPATIENT
Start: 2018-06-06 | End: 2018-06-07 | Stop reason: HOSPADM

## 2018-06-06 RX ORDER — POLYETHYLENE GLYCOL 3350 17 G/17G
17 POWDER, FOR SOLUTION ORAL DAILY PRN
Status: DISCONTINUED | OUTPATIENT
Start: 2018-06-06 | End: 2018-06-07 | Stop reason: HOSPADM

## 2018-06-06 RX ORDER — ACETAMINOPHEN 650 MG/1
650 SUPPOSITORY RECTAL EVERY 4 HOURS PRN
Status: DISCONTINUED | OUTPATIENT
Start: 2018-06-06 | End: 2018-06-07 | Stop reason: HOSPADM

## 2018-06-06 RX ORDER — LIDOCAINE 40 MG/G
CREAM TOPICAL
Status: DISCONTINUED | OUTPATIENT
Start: 2018-06-06 | End: 2018-06-06

## 2018-06-06 RX ORDER — ACETAMINOPHEN 325 MG/1
650 TABLET ORAL EVERY 4 HOURS PRN
Status: DISCONTINUED | OUTPATIENT
Start: 2018-06-06 | End: 2018-06-07 | Stop reason: HOSPADM

## 2018-06-06 RX ORDER — POTASSIUM CHLORIDE 29.8 MG/ML
20 INJECTION INTRAVENOUS
Status: DISCONTINUED | OUTPATIENT
Start: 2018-06-06 | End: 2018-06-07 | Stop reason: HOSPADM

## 2018-06-06 RX ORDER — AMOXICILLIN 250 MG
1 CAPSULE ORAL 2 TIMES DAILY PRN
Status: DISCONTINUED | OUTPATIENT
Start: 2018-06-06 | End: 2018-06-07 | Stop reason: HOSPADM

## 2018-06-06 RX ORDER — ALBUTEROL SULFATE 90 UG/1
2 AEROSOL, METERED RESPIRATORY (INHALATION) EVERY 6 HOURS PRN
Status: DISCONTINUED | OUTPATIENT
Start: 2018-06-06 | End: 2018-06-07 | Stop reason: HOSPADM

## 2018-06-06 RX ORDER — ONDANSETRON 4 MG/1
4 TABLET, ORALLY DISINTEGRATING ORAL EVERY 6 HOURS PRN
Status: DISCONTINUED | OUTPATIENT
Start: 2018-06-06 | End: 2018-06-07 | Stop reason: HOSPADM

## 2018-06-06 RX ORDER — ONDANSETRON 2 MG/ML
4 INJECTION INTRAMUSCULAR; INTRAVENOUS EVERY 6 HOURS PRN
Status: DISCONTINUED | OUTPATIENT
Start: 2018-06-06 | End: 2018-06-07 | Stop reason: HOSPADM

## 2018-06-06 RX ORDER — POTASSIUM CHLORIDE 1500 MG/1
20-40 TABLET, EXTENDED RELEASE ORAL
Status: DISCONTINUED | OUTPATIENT
Start: 2018-06-06 | End: 2018-06-07 | Stop reason: HOSPADM

## 2018-06-06 RX ORDER — BISACODYL 10 MG
10 SUPPOSITORY, RECTAL RECTAL DAILY PRN
Status: DISCONTINUED | OUTPATIENT
Start: 2018-06-06 | End: 2018-06-07 | Stop reason: HOSPADM

## 2018-06-06 RX ORDER — NALOXONE HYDROCHLORIDE 0.4 MG/ML
.1-.4 INJECTION, SOLUTION INTRAMUSCULAR; INTRAVENOUS; SUBCUTANEOUS
Status: DISCONTINUED | OUTPATIENT
Start: 2018-06-06 | End: 2018-06-07 | Stop reason: HOSPADM

## 2018-06-06 RX ORDER — POTASSIUM CHLORIDE 1.5 G/1.58G
20-40 POWDER, FOR SOLUTION ORAL
Status: DISCONTINUED | OUTPATIENT
Start: 2018-06-06 | End: 2018-06-07 | Stop reason: HOSPADM

## 2018-06-06 RX ORDER — AMOXICILLIN 250 MG
2 CAPSULE ORAL 2 TIMES DAILY PRN
Status: DISCONTINUED | OUTPATIENT
Start: 2018-06-06 | End: 2018-06-07 | Stop reason: HOSPADM

## 2018-06-06 RX ORDER — NICOTINE POLACRILEX 4 MG
15-30 LOZENGE BUCCAL
Status: DISCONTINUED | OUTPATIENT
Start: 2018-06-06 | End: 2018-06-07 | Stop reason: HOSPADM

## 2018-06-06 RX ORDER — MAGNESIUM SULFATE HEPTAHYDRATE 40 MG/ML
4 INJECTION, SOLUTION INTRAVENOUS EVERY 4 HOURS PRN
Status: DISCONTINUED | OUTPATIENT
Start: 2018-06-06 | End: 2018-06-07 | Stop reason: HOSPADM

## 2018-06-06 RX ORDER — POTASSIUM CL/LIDO/0.9 % NACL 10MEQ/0.1L
10 INTRAVENOUS SOLUTION, PIGGYBACK (ML) INTRAVENOUS
Status: DISCONTINUED | OUTPATIENT
Start: 2018-06-06 | End: 2018-06-07 | Stop reason: HOSPADM

## 2018-06-06 RX ORDER — DEXTROSE MONOHYDRATE 25 G/50ML
25-50 INJECTION, SOLUTION INTRAVENOUS
Status: DISCONTINUED | OUTPATIENT
Start: 2018-06-06 | End: 2018-06-07 | Stop reason: HOSPADM

## 2018-06-06 RX ORDER — POTASSIUM CHLORIDE 7.45 MG/ML
10 INJECTION INTRAVENOUS
Status: DISCONTINUED | OUTPATIENT
Start: 2018-06-06 | End: 2018-06-07 | Stop reason: HOSPADM

## 2018-06-06 RX ADMIN — SODIUM CHLORIDE 1000 ML: 9 INJECTION, SOLUTION INTRAVENOUS at 17:52

## 2018-06-06 RX ADMIN — SODIUM CHLORIDE: 9 INJECTION, SOLUTION INTRAVENOUS at 21:57

## 2018-06-06 ASSESSMENT — ENCOUNTER SYMPTOMS
BACK PAIN: 0
APNEA: 1
HEADACHES: 0
NECK PAIN: 0
ABDOMINAL PAIN: 0
WOUND: 0

## 2018-06-06 NOTE — ED NOTES
"[facility]  ED Nurse Handoff Report    ED Chief complaint: Drug Overdose (snorted oxycodone, spouse found patient lying unresponsive on floor of bathroom, initiated CPR.  Fire found pulse thready and sats in the 20s.  Given 2mg narcan.  Patient more arousable.  Given another 1mg in rig, patient alert to self.  Airway patent. )      ED Diagnosis:   Final diagnoses:   None       Code Status: Full Code    Allergies:   Allergies   Allergen Reactions     Seasonal Allergies      Azithromycin Rash       Activity level - Baseline/Home:  Independent    Activity Level - Current:   Stand with Assist     Needed?: No    Isolation: No  Infection: Not Applicable    Bariatric?: No    Vital Signs:   Vitals:    06/06/18 1738 06/06/18 1745   BP: 118/80    Resp: 18 12   Temp: 97.5  F (36.4  C)    TempSrc: Oral    SpO2: 98% 98%   Weight: 79.4 kg (175 lb)    Height: 1.803 m (5' 11\")        Cardiac Rhythm: ,   Cardiac  Cardiac Rhythm: Sinus tachycardia, irregular     Pain level:      Is this patient confused?: Yes   Suicidality: Screened negative    Patient Report: Initial Complaint: Drug Overdose  Focused Assessment: Pt was found unresponsive by spouse in bathroom after snorting possibly oxycodone.  CPR was initiated by spouse, pulse palpable by the time medics arrived.  Administered 2mg narcan, improved sats and patient more awake.  Given additional 1mg narcan on way to ED.  Pt confused but awake on arrival.  CT head negative.  Patient frequently asking same questions about \"did I hit my head\" and \"where am I?\"  Alert to self and spouse sitting next to patient.  VSS.  .  /70.  Given 1L NS bolus.  Glucose elevated 378.  Will be admitted for further monitoring.   Tests Performed: blood work/EKG/head CT  Abnormal Results: see above  Treatments provided: 1L NS bolus, capnography    Family Comments: spouse @ bedside    OBS brochure/video discussed/provided to patient: No    ED Medications: "   Medications   0.9% sodium chloride BOLUS (1,000 mLs Intravenous New Bag 6/6/18 7933)     Followed by   sodium chloride 0.9% infusion (not administered)       Drips infusing?:  Yes    For the majority of the shift this patient was Green.   Interventions performed were comfort measures.    Severe Sepsis OR Septic Shock Diagnosis Present: No      ED NURSE PHONE NUMBER: *[phone]

## 2018-06-06 NOTE — IP AVS SNAPSHOT
[facility]    [address] [place] [address]    Phone:  [phone]                                       After Visit Summary   6/6/2018    Matthew Bone    MRN: [id number]           After Visit Summary Signature Page     I have received my discharge instructions, and my questions have been answered. I have discussed any challenges I see with this plan with the nurse or doctor.    ..........................................................................................................................................  Patient/Patient Representative Signature      ..........................................................................................................................................  Patient Representative Print Name and Relationship to Patient    ..................................................               ................................................  Date                                            Time    ..........................................................................................................................................  Reviewed by Signature/Title    ...................................................              ..............................................  Date                                                            Time

## 2018-06-06 NOTE — IP AVS SNAPSHOT
MRN:[id number]                      After Visit Summary   6/6/2018    Matthew Bone    MRN: [id number]           Thank you!     Thank you for choosing [facility] for your care. Our goal is always to provide you with excellent care. Hearing back from our patients is one way we can continue to improve our services. Please take a few minutes to complete the written survey that you may receive in the mail after you visit with us. Thank you!        Patient Information     Date Of Birth          1990        Designated Caregiver       Most Recent Value    Caregiver    Will someone help with your care after discharge? yes    Name of designated caregiver Lela Bone    Phone number of caregiver [phone]    Caregiver address see facesheet      About your hospital stay     You were admitted on:  June 6, 2018 You last received care in the:  [place] [facility] [address] [facility]    You were discharged on:  June 7, 2018        Reason for your hospital stay       Drug overdose                  Who to Call     For medical emergencies, please call 911.  For non-urgent questions about your medical care, please call your primary care provider or clinic, [phone]          Attending Provider     Provider Specialty    Janett Leal MD Emergency Medicine    Rai, Sin Dejesus DO Internal Medicine       Primary Care Provider Office Phone # Fax #    Mary Reyes [phone]      After Care Instructions     Activity       Your activity upon discharge: activity as tolerated            Diet       Follow this diet upon discharge: Orders Placed This Encounter      Combination Diet Regular Diet Adult                  Follow-up Appointments     Follow-up and recommended labs and tests        Follow up with primary care provider, Mary Reyes, within 7 days for hospital follow- up.  No follow up labs or test are needed.recommended chemical dependency  Treatment for ongoing oxycodone use .                   Pending Results     No orders found for last 3 day(s).            Statement of Approval     Ordered          06/07/18 1019  I have reviewed and agree with all the recommendations and orders detailed in this document.  EFFECTIVE NOW     Approved and electronically signed by:  Mary Parsons MD             Admission Information     Date & Time Provider Department Dept. Phone    6/6/2018 Sin Atwood DO [place] [address] [facility] [phone]      Your Vitals Were     Blood Pressure Temperature Respirations Height Weight Pulse Oximetry    116/74 98.3  F (36.8  C) 12 1.829 m (6') 79.2 kg (174 lb 9.7 oz) 98%    BMI (Body Mass Index)                   23.68 kg/m2           MyChart Information     [facility] gives you secure access to your electronic health record. If you see a primary care provider, you can also send messages to your care team and make appointments. If you have questions, please call your primary care clinic.  If you do not have a primary care provider, please call [phone] and they will assist you.        Care EveryWhere ID     This is your Care EveryWhere ID. This could be used by other organizations to access your [facility] medical records  [id number]        Equal Access to Services     ISHMAEL OSBORNE : Hadii elisabeth Carter, warezada luluz maria, qaybta kaalmada carina, navarro sumner. So [facility] [phone].    ATENCIÓN: Si habla español, tiene a rivera disposición servicios gratuitos de asistencia lingüística. Llterrence al [phone].    We comply with applicable federal civil rights laws and [place] laws. We do not discriminate on the basis of race, color, national origin, age, disability, sex, sexual orientation, or gender identity.               Review of your medicines      CONTINUE these medicines which have NOT CHANGED        Dose / Directions    fluticasone 50 MCG/ACT spray   Commonly known as:  FLONASE        Dose:  1  spray   Spray 1 spray into both nostrils daily   Refills:  0       IBUPROFEN PO        Dose:  400-600 mg   Take 400-600 mg by mouth every 8 hours as needed   Refills:  0       VENTOLIN  (90 Base) MCG/ACT Inhaler   Used for:  Reactive airway disease, mild intermittent, uncomplicated   Generic drug:  albuterol        Dose:  2 puff   Inhale 2 puffs into the lungs every 6 hours as needed for shortness of breath / dyspnea or wheezing   Quantity:  1 Inhaler   Refills:  3         STOP taking     hydrOXYzine 25 MG tablet   Commonly known as:  ATARAX                    Protect others around you: Learn how to safely use, store and throw away your medicines at www.disposemymeds.org.             Medication List: This is a list of all your medications and when to take them. Check marks below indicate your daily home schedule. Keep this list as a reference.      Medications           Morning Afternoon Evening Bedtime As Needed    fluticasone 50 MCG/ACT spray   Commonly known as:  FLONASE   Spray 1 spray into both nostrils daily                                IBUPROFEN PO   Take 400-600 mg by mouth every 8 hours as needed                                   VENTOLIN  (90 Base) MCG/ACT Inhaler   Inhale 2 puffs into the lungs every 6 hours as needed for shortness of breath / dyspnea or wheezing   Generic drug:  albuterol                                             More Information        Head Injury with Sleep Monitoring (Adult)    You have a head injury. It does not appear serious at this time. But symptoms of a more serious problem, such as mild brain injury (concussion), or bruising or bleeding in the brain, may appear later. For this reason, you and someone caring for you will need to watch for the symptoms listed below. Once at home, also be sure to follow any care instructions you re given.  Home care  Watch for the following symptoms  Someone must stay with you for the next 24 hours (or longer, if directed). If  you fall asleep, this person should wake you up every 2 hours to check your symptoms. This is called sleep monitoring. Symptoms to watch for include:    Headache    Nausea or vomiting    Dizziness    Sensitivity to light or noise    Unusual sleepiness or grogginess    Trouble falling asleep    Personality changes    Vision changes    Memory loss    Confusion    Trouble walking or clumsiness    Loss of consciousness (even for a short time)    Inability to be awakened    Stiff neck    Weakness or numbness in any part of the body    Seizures  If you develop any of these symptoms, seek emergency medical medical care right away. If none of these symptoms are noted during the first 24 hours, keep watching for symptoms for the next day or so. Ask your provider if someone should stay with you during this time.   General care    If you were prescribed medicines for pain, use them as directed. Note: Don t use other pain medicines without checking with your provider first.    To help reduce swelling and pain, apply a cold source to the injured area for up to 20 minutes at a time. Do this as often as directed. Use a cold pack or bag of ice wrapped in a thin towel. Never apply a cold source directly to the skin.    If you have cuts or scrapes as a result of your injury, care for them as directed.    For the next 24 hours (or longer, if instructed):  ? Don t drink alcohol or use sedatives or other medicines that make you sleepy.  ? Don t drive or operate machinery.  ? Don t do anything strenuous, such as heavy lifting or straining.  ? Limit tasks that require concentration. This includes reading, using a smartphone or computer, watching TV, and playing video games.  ? Don t return to sports or other activity that could result in another head injury.  Follow-up care  Follow up with your healthcare provider, or as directed. If imaging tests were done, they will be reviewed by a doctor. You will be told the results and any new  "findings that may affect your care.  When to seek medical advice  Call your healthcare provider right away if any of these occur:    Pain doesn t get better or worsens    New or increased swelling or bruising    Fever of 100.4 F (38 C) or higher, or as directed by your provider    Redness, warmth, bleeding, or drainage from the injured area    Any depression or bony abnormality in the injured area    Fluid drainage or bleeding from the nose or ears  Date Last Reviewed: 9/26/2015 2000-2017 [employer]. [address] [place] [address]. All rights reserved. This information is not intended as a substitute for professional medical care. Always follow your healthcare professional's instructions.              Naloxone (Narcan)  Frequently-asked questions  What is naloxone (Narcan)?  Naloxone (Narcan) is a prescription medicine that can reverse an overdose from opioid medicines, such as:    codeine, morphine    hydrocodone, oxycodone, hydromorphone, oxymorphone    fentanyl, meperidine, methadone    buprenorphine  Opioids can cause bad reactions. If you take more opioids than your body can handle (overdose), your breathing can slow too much or even stop. Naloxone blocks the effects of opioids on the brain, which can quickly reverse an overdose.   Naloxone cannot be used to get high and is not addictive. It is safe and effective. Emergency medical professionals have used it to save lives for decades.  Naloxone does not prevent deaths caused by other drugs, such as bath salts, cocaine, methamphetamine, alcohol and benzodiazepines: alprazolam, clonazepam, diazepam, lorazepam.  Who can carry or administer naloxone?  In [place], anyone at risk for having a drug overdose or witnessing one can get a prescription for naloxone. Users, family members and concerned friends can all carry naloxone in the same way people with allergies are allowed to carry an epinephrine syringe (\"epi-pen\").   For safety, " store naloxone in a locked cabinet or other space that is out of the reach of children and pets.  When should naloxone be given?  If you suspect an opioid overdose, look for these symptoms:    Breathing slows or stops; or, person has pinpoint pupils and won't wake up    No response, even if you shake them or say  their name    Lips and fingernails turn blue, purple or gray    Skin gets painful or clammy    Slowed heartbeat and/or low blood pressure  Even if you have reversed an overdose with naloxone, always call 911. Naloxone usually wears off in 30 to 90 minutes. The person can stop breathing again unless more naloxone is available. An overdose victim will also need other care. For these reasons, it is safest to call 911 and get medical care right away.  How long does naloxone take to work?    Naloxone begins to work in 2 to 5 minutes.    If the person doesn't wake up in 3 minutes, bystanders should give a second dose.    Rescue breathing should be done while you wait for the naloxone to work. This will make sure the person gets enough oxygen to the brain.  How do you give naloxone?  Bystanders can safely and legally spray naloxone into the nose (nasal spray) or inject it into the thigh.     Nasal spray (with assembly): Place the foam tip (atomizer) onto a syringe and put into the nostril. Spray one half of the capsule (1 mg) into each nostril.    Nasal spray (no assembly needed): Spray up one nostril by pushing the plunger.    Injection into the muscle (with assembly or by auto-injector): Inject into the outer thigh. In an emergency, it is safe to inject through clothing. The auto-injector contains a speaker that provides step-by-step instructions.  Can naloxone harm someone?  No. It is safe to give naloxone any time you suspect an overdose. (It will not hurt if you are wrong about it being an overdose.) People who receive naloxone for an overdose may wake up and go into withdrawal. Withdrawal can be miserable,  "but it is better than dying.   Symptoms of withdrawal include:    Feeling nervous, restless or irritable    Body aches    Dizziness or weakness    Diarrhea, stomach pain or feeling a little sick    Fever, chills or goose bumps    Sneezing or runny nose  Talk with your doctor about how to deal with these and other feelings of withdrawal.   Is naloxone just a \"safety net\" that allows users to use even more?  Research has found that having naloxone available does not encourage people to use opioids more. The goal of distributing naloxone and educating people about how to prevent, recognize and intervene in overdoses is to prevent deaths. Other goals, such as decreasing drug use, can only be met if the user is alive.  What are the side effects of naloxone?  Call 911 right away if you have:    An allergic reaction, such as large bumps on your skin (hives), trouble breathing, swelling of the face, lips, tongue or throat. (Don't drive or perform unsafe tasks until you know how you will react to naloxone.)    Chest pain or fast or irregular heart beat    Increase in blood pressure    Muscle pain or cramping    Nasal dryness, congestion or inflammation    Shortness of breath    Sweating, feeling very sick to your stomach or throwing up    Bad headache, agitation, anxiety, confusion or ringing in your ears    Seizures (convulsions)    Dizziness, fainting or feeling like you might pass out  For informational purposes only. Not to replace the advice of your health care provider.   Copyright   2015 [employer]. All rights reserved. [employer] 461352 - REV 07/17.         "

## 2018-06-06 NOTE — ED PROVIDER NOTES
History     Chief Complaint:  Drug Overdose     HPI   The patient's history was somewhat limited secondary to drug ingestion    Matthew Bone is a 27 year old male with a history of recreational substance abuse who presents via EMS with a drug overdose. Of note, the patient does have a history of recreational drug use and snorts Oxycodone occasionally. According to the patient's wife, she left their home around noon to go run errands and returned home at 1630. When she returned home, she found the patient to be unresponsive and not breathing on the bathroom floor. EMS was contacted and the patient's wife initiated CPR. On EMS arrival, the patient was found to have a thready pulse with sats in the 20s. The patient was given 2 mg Narcan and paramedics were subsequently able to rouse him. After being given an additional 1 mg of Narcan en route to the ED, he became more alert and was talking.     Here in the ED now, the patient is complaining of tinnitus. He denies any other pain at the present time and denies any intention to self harm with his drug use today. His wife does not believe that he ingested any other substances today.     Allergies:  Seasonal Allergies  Azithromycin    Medications:    Albuterol  Colace  Flonase  Atarax    Past Medical History:    ADHD  Constipation  Premature atrial contraction  Reactive airway disease    Past Surgical History:    Tooth extraction    Family History:    HTN  Obesity  Asthma    Social History:  Presents with his wife.   Current Some day smoker.   Positive for alcohol use, occasionally.   Marital Status:  Single [1]    Review of Systems   HENT: Positive for tinnitus.    Respiratory: Positive for apnea (Resolved).    Gastrointestinal: Negative for abdominal pain.   Musculoskeletal: Negative for back pain and neck pain.   Skin: Negative for wound.   Neurological: Negative for headaches.   Psychiatric/Behavioral: Positive for behavioral problems.   somewhat limited secondary to  "drug ingestion  Physical Exam     Patient Vitals for the past 24 hrs:   BP Temp Temp src Heart Rate Resp SpO2 Height Weight   06/06/18 1900 115/79 - - 106 10 100 % - -   06/06/18 1845 107/70 - - 109 15 100 % - -   06/06/18 1830 107/67 - - 98 14 100 % - -   06/06/18 1815 114/71 - - 100 24 100 % - -   06/06/18 1745 - - - 111 12 98 % - -   06/06/18 1738 118/80 97.5  F (36.4  C) Oral 110 18 98 % 1.803 m (5' 11\") 79.4 kg (175 lb)       Physical Exam  General: Resting on the gurney, appears very confused  Head:  The scalp, face, and head appear normal  Mouth/Throat: Mucus membranes are moist  CV:  Regular rate    Normal S1 and S2  No pathological murmur   Resp:  Breath sounds clear and equal bilaterally    Non-labored, no retractions or accessory muscle use    No coarseness    No wheezing   GI:  Abdomen is soft, no rigidity    No tenderness to palpation  MS:  Normal motor assessment of all extremities.    Good capillary refill noted.      Skin:   No rash or lesions noted.  Neuro:  Amnestic to the events of today, repeats questions, amnestic to my conversation with him. Cranial nerves II-XII intact by examination, sensation intact x4, strength intact x4.  Psych:  Awake. Alert.  Denies thoughts of self harm    Emergency Department Course   ECG:  Time: 1743  Vent. Rate 116 bpm. MO interval 130. QRS duration 100. QT/QTc 322/447. P-R-T axis 49 21 36. Sinus tachycardia with premature supraventricular complexes, incomplete right bundle branch block, nonspecific ST abnormality, abnormal ECG. Read time: 1745    Imaging:  Radiographic findings were communicated with the patient who voiced understanding of the findings.    CT Head without contrast:   Negative CT scan of the head. As per radiology.    Laboratory:  CBC: WBC: 6.5, HGB: 13.6, PLT: 191  CMP: Glucose 378 (H), Calcium 8.0 (L), AST 67 (H) o/w WNL (Creatinine: 1.18)    Salicylate level: <2  Acetaminophen level: <2    Interventions:  1752 NS 1L IV    Emergency Department " Course:  Nursing notes and vitals reviewed. 1807 I performed an exam of the patient as documented above.     EKG obtained in the ED, see results above.     IV inserted. Medicine administered as documented above. Blood drawn. This was sent to the lab for further testing, results above.    The patient was sent for a CT Head while in the emergency department, findings above.     1930 I rechecked the patient and discussed the results of his workup thus far.     1940  I consulted with Dr. Atwood of the hospitalist services. He is in agreement to accept the patient for admission.    Findings and plan explained to the Patient and wife who consents to admission. Discussed the patient with Dr. Atwood, who will admit the patient [place] [facility] [place] for further monitoring, evaluation, and treatment.    Impression & Plan    Medical Decision Making:  Matthew Bone is a 27 year old male who presents to the Emergency Department for evaluation after an ingestion of oxycodone.   I did a broad workup here to make sure I was not missing a co-ingestion of acetaminophen, salicylates, or known dangerous prescribed medications.  Salicylates negative, acetaminophen negative.  EKG obtained and shows normal QRS, normal QT.      Neurologically, the patient is awake and alert.  Work up included: CT Head, blood work, and EKG.  Head CT showed no acute intracranial findings.  Glucose: 378.  Electrolytes: WNL.  Narcan given prior to arrival, with improvement in mental status.  No suicidal plan per patient and wife's report. Given potential side effects of ingestion and current symptoms, I am uncomfortable discharging patient at this time. Will admit to medicine for further close monitoring.  The patient's questions were answered.  Will need further psychiatric interview as inpatient.      Diagnosis:    ICD-10-CM   1. Accidental drug overdose, initial encounter T50.901A     Disposition:  Admitted to Dr. Atwood of the hospitalist services  Radha CRAVEN  Jareth, am serving as a scribe on 6/6/2018 at 6:07 PM to personally document services performed by Janett Leal MD based on my observations and the provider's statements to me.     Radha Templeton  6/6/2018    EMERGENCY DEPARTMENT       Janett Leal MD  06/08/18 1640

## 2018-06-06 NOTE — ED NOTES
Bed: ED27  Expected date:   Expected time:   Means of arrival:   Comments:  [facility]- 26 yo M opioid [id number] [id number]

## 2018-06-07 VITALS
WEIGHT: 174.6 LBS | DIASTOLIC BLOOD PRESSURE: 74 MMHG | HEIGHT: 72 IN | RESPIRATION RATE: 12 BRPM | TEMPERATURE: 98.3 F | OXYGEN SATURATION: 98 % | SYSTOLIC BLOOD PRESSURE: 116 MMHG | BODY MASS INDEX: 23.65 KG/M2

## 2018-06-07 LAB
ALBUMIN SERPL-MCNC: 3.2 G/DL (ref 3.4–5)
ALP SERPL-CCNC: 50 U/L (ref 40–150)
ALT SERPL W P-5'-P-CCNC: 42 U/L (ref 0–70)
AMPHETAMINES UR QL SCN: NEGATIVE
ANION GAP SERPL CALCULATED.3IONS-SCNC: 7 MMOL/L (ref 3–14)
AST SERPL W P-5'-P-CCNC: 33 U/L (ref 0–45)
BARBITURATES UR QL: NEGATIVE
BENZODIAZ UR QL: POSITIVE
BILIRUB SERPL-MCNC: 0.4 MG/DL (ref 0.2–1.3)
BUN SERPL-MCNC: 10 MG/DL (ref 7–30)
CALCIUM SERPL-MCNC: 8.1 MG/DL (ref 8.5–10.1)
CANNABINOIDS UR QL SCN: POSITIVE
CHLORIDE SERPL-SCNC: 106 MMOL/L (ref 94–109)
CO2 SERPL-SCNC: 28 MMOL/L (ref 20–32)
COCAINE UR QL: NEGATIVE
CREAT SERPL-MCNC: 0.86 MG/DL (ref 0.66–1.25)
ERYTHROCYTE [DISTWIDTH] IN BLOOD BY AUTOMATED COUNT: 12.6 % (ref 10–15)
GFR SERPL CREATININE-BSD FRML MDRD: >90 ML/MIN/1.7M2
GLUCOSE SERPL-MCNC: 79 MG/DL (ref 70–99)
HCT VFR BLD AUTO: 34.7 % (ref 40–53)
HGB BLD-MCNC: 11.4 G/DL (ref 13.3–17.7)
INR PPP: 1.09 (ref 0.86–1.14)
INTERPRETATION ECG - MUSE: NORMAL
MCH RBC QN AUTO: 27.7 PG (ref 26.5–33)
MCHC RBC AUTO-ENTMCNC: 32.9 G/DL (ref 31.5–36.5)
MCV RBC AUTO: 84 FL (ref 78–100)
OPIATES UR QL SCN: NEGATIVE
PCP UR QL SCN: NEGATIVE
PLATELET # BLD AUTO: 161 10E9/L (ref 150–450)
POTASSIUM SERPL-SCNC: 3.6 MMOL/L (ref 3.4–5.3)
PROT SERPL-MCNC: 6 G/DL (ref 6.8–8.8)
RBC # BLD AUTO: 4.12 10E12/L (ref 4.4–5.9)
SODIUM SERPL-SCNC: 141 MMOL/L (ref 133–144)
WBC # BLD AUTO: 6 10E9/L (ref 4–11)

## 2018-06-07 PROCEDURE — 80053 COMPREHEN METABOLIC PANEL: CPT | Performed by: HOSPITALIST

## 2018-06-07 PROCEDURE — 25000128 H RX IP 250 OP 636: Performed by: HOSPITALIST

## 2018-06-07 PROCEDURE — 99239 HOSP IP/OBS DSCHRG MGMT >30: CPT | Performed by: INTERNAL MEDICINE

## 2018-06-07 PROCEDURE — 85610 PROTHROMBIN TIME: CPT | Performed by: HOSPITALIST

## 2018-06-07 PROCEDURE — 99222 1ST HOSP IP/OBS MODERATE 55: CPT | Performed by: PSYCHIATRY & NEUROLOGY

## 2018-06-07 PROCEDURE — 36415 COLL VENOUS BLD VENIPUNCTURE: CPT | Performed by: HOSPITALIST

## 2018-06-07 PROCEDURE — 85027 COMPLETE CBC AUTOMATED: CPT | Performed by: HOSPITALIST

## 2018-06-07 RX ADMIN — SODIUM CHLORIDE: 9 INJECTION, SOLUTION INTRAVENOUS at 07:40

## 2018-06-07 NOTE — PLAN OF CARE
Problem: Overdose, Ingestion/Inhalants (Adult)  Goal: Signs and Symptoms of Listed Potential Problems Will be Absent, Minimized or Managed (Overdose, Ingestion/Inhalants)  Signs and symptoms of listed potential problems will be absent, minimized or managed by discharge/transition of care (reference Overdose, Ingestion/Inhalants (Adult) CPG).   Pt alert to person only.  Spouse is at bedside--very worried about his memory loss and repetitive questions.  She thinks he could have been down almost an hour before she found him.  IVF infusing.  VSS.  Voiding adequately.  Psych consult ordered.

## 2018-06-07 NOTE — PHARMACY-ADMISSION MEDICATION HISTORY
Admission medication history interview status for the 6/6/2018  admission is complete. See EPIC admission navigator for prior to admission medications     Medication history source reliability:Moderate    Actions taken by pharmacist (provider contacted, etc):None     Additional medication history information not noted on PTA med list :  -He reports regularly using Flonase but nothing else on regular basis and may use oxycodone recreationally but not for pain management.    Medication reconciliation/reorder completed by provider prior to medication history? Yes    Time spent in this activity: 10 min    Prior to Admission medications    Medication Sig Last Dose Taking? Auth Provider   fluticasone (FLONASE) 50 MCG/ACT spray Spray 1 spray into both nostrils daily 6/5/2018 at Unknown time Yes Reported, Patient   IBUPROFEN PO Take 400-600 mg by mouth every 8 hours as needed  Past Month at Unknown time Yes Reported, Patient   VENTOLIN  (90 BASE) MCG/ACT Inhaler Inhale 2 puffs into the lungs every 6 hours as needed for shortness of breath / dyspnea or wheezing Past Month at Unknown time Yes Mary Reyes MD   hydrOXYzine (ATARAX) 25 MG tablet Take 1-2 tablets (25-50 mg) by mouth every 8 hours as needed for anxiety More than a month at Unknown time  Mary Reyes MD

## 2018-06-07 NOTE — PLAN OF CARE
Problem: Overdose, Ingestion/Inhalants (Adult)  Goal: Signs and Symptoms of Listed Potential Problems Will be Absent, Minimized or Managed (Overdose, Ingestion/Inhalants)  Signs and symptoms of listed potential problems will be absent, minimized or managed by discharge/transition of care (reference Overdose, Ingestion/Inhalants (Adult) CPG).   Outcome: Improving  Pt discharged with wife Lela after discharge instructions given to patient . Pt was on IMC status iv discontinued  Telemetry normal sinus prior to discontinuing . Pt denies headache and alert and orientated now and wife states he is fine this morning back to his baseline .  Pt requests to ambulate out of hospital with wife declines w/c and escort.

## 2018-06-07 NOTE — CONSULTS
"Consult Date:  06/07/2018      PSYCHIATRY CONSULTATION       REQUESTING PHYSICIAN:  Sin Atwood DO      REASON FOR CONSULTATION:  Overdose.      IDENTIFYING DATA:  The patient is a 27-year-old single  male admitted with altered mental status after an unintentional overdose where he was snorting OxyContin.      CHIEF COMPLAINT:  \"I was snorting OxyContin.\"       HISTORY OF PRESENT ILLNESS:  The patient is a 27-year-old gentleman with a notable substance use history who sporadically snorts oxycodone and smokes marijuana daily.  He also sporadically uses benzodiazepines.  His wife found him on the floor unconscious and possibly without a pulse, according to her report.  CPR was initiated and EMS was contacted.  The patient believes he snorted 30 mg of oxycodone.  His drug screen shows cannabinoids and benzodiazepines.  He has never been in treatment.  He says he was recently in [facility] and they were on their honeymoon, he is newly .  He has never completed any chemical dependency treatment and has no interest in doing so.  He states \"I would really not be interested in that.\"  He minimizes his substance use, states it is sporadic.  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  He lives locally.  He denies any previous psychiatric history and is not on scheduled medications at this time.      On further questioning, he is really not endorsing any active psychiatric symptoms such as hypomania, erna, depression, anxiety, OCD symptoms, eating disorder history, trauma history, etc.  He denies ever being hospitalized for complications from his chemical use.  We had a discussion about chemical use, my strong advice that he maintains sobriety and consider chemical dependency evaluation if this pattern abuse persists, given the perils of using opiates and the potential for a lethal outcome.  He was appreciative of that input and seems to understand those concerns.  Currently, he is fully " "oriented.  He is cooperative, not showing any signs of withdrawal agitation or safety concerns.      PAST PSYCHIATRIC HISTORY:  Described as having possible ADHD.  He has been off medication since childhood.      PAST CHEMICAL DEPENDENCY HISTORY:  Appears to be significant for an untreated opiate use, cannabis use, and benzodiazepine use disorder.  He suggests his use of oxycodone and benzodiazepines is sporadic and thus withdrawal risk appears to be minimal.      PAST MEDICAL HISTORY:  Notable for premature atrial contractions and constipation, otherwise negative.        PRIOR TO ADMISSION MEDICATIONS:  Listed as Colace, ibuprofen, Ventolin inhaler, Proventil, Flonase and Atarax.      FAMILY HISTORY:  It sounds like there may be some depression in his mother.      SOCIAL HISTORY:  The patient is newly , no children, works as a musician.  He is a high school graduate and lives locally.  He did some college course work but is not pursuing further education at this time.  He denies legal history, trauma history or  history.      REVIEW OF SYSTEMS:  A 10-point review of systems is currently negative.        MOST RECENT VITAL SIGNS:  Temperature 98.3, heart rate 46, respiratory rate 11, blood pressure 90/68, oxygen saturation 98%.      MENTAL STATUS EXAMINATION:  Appearance:  The patient is a pleasant man lying in bed.  He has shoulder length blonde hair.  He is a fair historian, minimizing his substance use.  Speech is of normal rate, flow and tone, use of language appropriate.  Motor exam calm.  Coordination, station, and gait within normal limits.  Muscle strength and tone adequate.  Affect is subdued but pleasant.  Mood \"okay.\"  Thought process logical, coherent and goal directed.  No loosening of associations, no flight of ideas.  No formal thought disorder on exam.  Thought content, negative for hallucinations, delusions, paranoia, suicidal or homicidal ideation.  Insight and judgment poor with " respect to chemical use.  Cognitive exam, the patient is alert and oriented x 3.  Concentration intact.  Recent memory slightly impaired due to his overdose.  Remote memory grossly intact.  General fund of knowledge average.      IMPRESSION:  The patient is a 27-year-old gentleman presenting with polydrug ingestion, the likely issue for him was a rapid ingestion of oxycodone in combination with benzodiazepines leading to respiratory depression.  He is not interested in CD assessment, he could benefit from that because he clearly has a chemical dependency issue.  He is minimizing this, there is no basis for a hold or commitment.  I did  him on the perils of substance use and the potential deleterious/lethal outcome that could ensue if he continues with this pattern of behavior.      DIAGNOSES:   1.  Delirium secondary to drug overdose.   2.  Opiate use disorder.   3.  Cannabis use disorder.   4.  Rule out benzodiazepine use disorder.      PLAN:   1.  Medical management, it looks to me like he can be safely discharged today.   2.  Offered CD assessment, which he flatly declines.         ALETHEA ESTEBAN MD             D: 2018   T: 2018   MT: CC      Name:     YENY CARD   MRN:      [id number]        Account:       [id number]   :      1990           Consult Date:  2018      Document: [id number]       cc: Mary Atwood DO

## 2018-06-07 NOTE — PLAN OF CARE
Problem: Patient Care Overview  Goal: Plan of Care/Patient Progress Review  Outcome: No Change  ED admit @ 2130. VSS. Tele NSR. Capno monitoring in place. Pt confused, asking repetitive questions, easily reoriented and cooperative. Up with SBA. Regular diet. Blood sugar checked - 87 at HS. NS@100. Needs urine sample. Plan for psych consult in AM. Wife at bedside.

## 2018-06-07 NOTE — H&P
[facility]    History and Physical  Hospitalist       Date of Admission:  6/6/2018  Date of Service (when I saw the patient): 06/06/18    Assessment & Plan   Matthew Bone is a 27 year old male who presents with drug overdose.  Admitted for further evaluation and treatment.    Drug overdose: Patient snorts oxycodone and overdose today.  His wife found him on the floor unconscious and possibly without a pulse according to her report.  Per report, CPR was initiated and EMS contacted for rapid transport to the emergency department.  Patient states that he snorted 30 mg of oxycodone.  States that he uses marijuana.  States that he does not use other illicit drugs that he is willing to confess.  He just spent a month in [place] per wife report on honeymoon.  Electrolytes on admission are normal range with the exception of a mildly low calcium.  Liver function testing is normal range with the exception of AST mildly elevated.  Glucose level is elevated at 378 on admission.  Salicylate level is less than 2 and acetaminophen level is less than 2.  - Close hemodynamic monitoring.  - Capnography.  - Narcan p.r.n.  - Psychiatry consult.  - IV fluids.  - Urine drug screen.  - [facility] unit.    Head contusion: Patient with head contusion reported.  CT of the head was completed with negative findings, per report.  - Monitor.    Elevated glucose: Patient with glucose of 378 on admission.  - Insulin sliding scale.     # Pain Assessment:   - Matthew is experiencing pain due to head. Pain management was discussed and the plan was created in a collaborative fashion.  Matthew's response to the current recommendations: mixed response  - Please see the plan for pain management as documented above.     DVT Prophylaxis: Pneumatic Compression Devices  Code Status: Full Code    Disposition: [facility].    Dr. Sin Atwood D.O.  [facility] Hospitalist  Pager [phone]    Primary Care Physician   Mary Reyes    Chief  Complaint   Drug overdose    History is obtained from the patient and medical records.     History of Present Illness   Matthew Bone is a 27 year old male who presents with drug overdose.  Admitted for further evaluation and treatment. Patient snorts oxycodone and overdose today.  His wife found him on the floor unconscious and possibly without a pulse according to her report.  Per report, CPR was initiated and EMS contacted for rapid transport to the emergency department.  Patient states that he snorted 30 mg of oxycodone.  States that he uses marijuana.  States that he does not use other illicit drugs that he is willing to confess.  He just spent a month in [place] per wife report on honeymoon.  Electrolytes on admission are normal range with the exception of a mildly low calcium.  Liver function testing is normal range with the exception of AST mildly elevated.  Glucose level is elevated at 378 on admission.  Salicylate level is less than 2 and acetaminophen level is less than 2.  Patient with altered mental status on admission and is unable to completely give history of present illness or review of systems.    Past Medical History    I have reviewed this patient's medical history and updated it with pertinent information if needed.   Past Medical History:   Diagnosis Date     ADHD (attention deficit hyperactivity disorder)     since childhood off medication     Constipation     food related, when on tours.     PAC (premature atrial contraction)     noitced for many years, no sigsn or symtoms.       Past Surgical History   I have reviewed this patient's surgical history and updated it with pertinent information if needed.  Past Surgical History:   Procedure Laterality Date     HC TOOTH EXTRACTION W/FORCEP  2013       Prior to Admission Medications   Prior to Admission Medications   Prescriptions Last Dose Informant Patient Reported? Taking?   Docusate Sodium (COLACE PO)   Yes No   Sig: Take 100 mg by mouth  once as needed    IBUPROFEN PO   Yes No   VENTOLIN  (90 BASE) MCG/ACT Inhaler   No No   Sig: Inhale 2 puffs into the lungs every 6 hours as needed for shortness of breath / dyspnea or wheezing   Patient not taking: Reported on 3/19/2018   albuterol (PROVENTIL) (5 MG/ML) 0.5% nebulizer solution   Yes No   Sig: Inhale 2.5 mg into the lungs every 6 hours as needed for wheezing or shortness of breath / dyspnea   fluticasone (FLONASE) 50 MCG/ACT spray   Yes No   Sig: Spray 1 spray into both nostrils daily   hydrOXYzine (ATARAX) 25 MG tablet   No No   Sig: Take 1-2 tablets (25-50 mg) by mouth every 8 hours as needed for anxiety   Patient not taking: Reported on 3/19/2018      Facility-Administered Medications: None     Allergies   Allergies   Allergen Reactions     Seasonal Allergies      Azithromycin Rash       Social History   I have reviewed this patient's social history and updated it with pertinent information if needed. Matthew Bone  reports that he has been smoking Cigars and Cigarettes.  He has never used smokeless tobacco. He reports that he drinks alcohol. He reports that he uses illicit drugs, including Marijuana and Opiates.    Family History   I have reviewed this patient's family history and updated it with pertinent information if needed.   Family History   Problem Relation Age of Onset     Hypertension Mother      Obesity Mother      DIABETES No family hx of      Obesity Father      Asthma Father      CANCER Maternal Grandfather      lung, smoker     Alcohol/Drug Maternal Grandfather      alcohol     CANCER Paternal Grandmother      unsure which type     Unknown/Adopted Sister        Review of Systems   The 10 point Review of Systems is negative other than noted in the HPI or here.     Physical Exam   Temp: 97.5  F (36.4  C) Temp src: Oral BP: 115/79   Heart Rate: 106 Resp: 10 SpO2: 100 % O2 Device: None (Room air)    Vital Signs with Ranges  Temp:  [97.5  F (36.4  C)] 97.5  F (36.4  C)  Heart  Rate:  [] 106  Resp:  [10-24] 10  BP: (107-118)/(67-80) 115/79  SpO2:  [98 %-100 %] 100 %  175 lbs 0 oz    GENERAL: Alert and disoriented. NAD. Conversational, appropriate.   HEENT: Normocephalic. EOMI. No icterus or injection. Nares normal.  Tender to back of head.  No step-offs or bony abnormality.  LUNGS: Clear to auscultation. No dyspnea at rest.   HEART: Regular rate, tachycardic. Extremities perfused.   ABDOMEN: Soft, nontender, and nondistended. Positive bowel sounds.   EXTREMITIES: No LE edema noted.   NEUROLOGIC: Moves extremities x4 on command. No acute focal neurologic abnormalities noted.     Data   Data reviewed today:  I personally reviewed both laboratory and imaging data.     Recent Labs  Lab 06/06/18  1734   WBC 6.5   HGB 13.6   MCV 86         POTASSIUM 3.6   CHLORIDE 101   CO2 24   BUN 14   CR 1.18   ANIONGAP 12   FLOR 8.0*   *   ALBUMIN 3.6   PROTTOTAL 6.8   BILITOTAL 0.3   ALKPHOS 67   ALT 53   AST 67*       Recent Results (from the past 24 hour(s))   Head CT w/o contrast    Narrative    CT HEAD W/O CONTRAST  6/6/2018 6:12 PM    HISTORY: Mental status change. Amnesia.    TECHNIQUE: Scans were obtained through the head without IV contrast.   Radiation dose for this scan was reduced using automated exposure  control, adjustment of the mA and/or kV according to patient size, or  iterative reconstruction technique.    COMPARISON: None.    FINDINGS: No hemorrhage, mass lesion, or focal area of acute  infarction identified. Paranasal sinuses are normal. No bony  abnormality.      Impression    IMPRESSION: Negative CT scan of the head.    GAGE ONTIVEROS MD

## 2018-06-07 NOTE — DISCHARGE SUMMARY
[facility]    Discharge Summary  Hospitalist    Date of Admission:  6/6/2018  Date of Discharge:  6/7/2018  Discharging Provider: Mary Parsons MD    Discharge Diagnoses   Drug overdose     History of Present Illness   Matthew Bone is a 27 year old male who presents with drug overdose.  Admitted for further evaluation and treatment. Patient snorts oxycodone and overdose today.  His wife found him on the floor unconscious and possibly without a pulse according to her report.  Per report, CPR was initiated and EMS contacted for rapid transport to the emergency department.  Patient states that he snorted 30 mg of oxycodone.  States that he uses marijuana.  States that he does not use other illicit drugs that he is willing to confess.  He just spent a month in [place] per wife report on honeymoon.  Electrolytes on admission are normal range with the exception of a mildly low calcium.  Liver function testing is normal range with the exception of AST mildly elevated.  Glucose level is elevated at 378 on admission.  Salicylate level is less than 2 and acetaminophen level is less than 2.  Patient with altered mental status on admission .    Hospital Course   Matthew Bone was admitted on 6/6/2018.  The following problems were addressed during his hospitalization:    Active Problems:    Overdose  Following admission, he was occasionally confused overnight.  He was seen by psychiatry and chemical dependency treatment was offered.  Patient did not want to use chemical dependency treatment.  When I visited him today morning he is awake alert oriented ×3.  His wife is near bedside and she mentioned that he is back to baseline.  Patient wanted to go home today.  He plan not to use oxycodone but does not want any help in the process.  His labs today indicate normal LFT and BMP, he had mild dilutional anemia.  His urine drug screen was also positive for benzodiazepines and cannabinoids.  Patient is now  insisting on leaving and no hold has been placed on him as per psychiatry so we will discharge him home with follow-up with PCP.  This plan was discussed with his wife near bedside.    # Discharge Pain Plan:   - Patient currently has NO PAIN and is not being prescribed pain medications on discharge.    Mary Parsons MD    Significant Results and Procedures       Pending Results     Unresulted Labs Ordered in the Past 30 Days of this Admission     No orders found for last 61 day(s).          Code Status   Full Code       Primary Care Physician   Mary Reyes    Physical Exam   Temp: 98.3  F (36.8  C) Temp src: Oral BP: 116/74   Heart Rate: 52 Resp: 12 SpO2: 98 % O2 Device: Nasal cannula Oxygen Delivery: 3 LPM  Vitals:    06/06/18 1738 06/06/18 2130 06/07/18 0359   Weight: 79.4 kg (175 lb) 79.5 kg (175 lb 4.3 oz) 79.2 kg (174 lb 9.7 oz)     Vital Signs with Ranges  Temp:  [97.5  F (36.4  C)-98.5  F (36.9  C)] 98.3  F (36.8  C)  Heart Rate:  [] 52  Resp:  [10-24] 12  BP: ()/(57-80) 116/74  SpO2:  [96 %-100 %] 98 %  I/O last 3 completed shifts:  In: 836 [I.V.:836]  Out: 900 [Urine:900]    The patient was examined on the day of discharge.    Discharge Disposition   Discharged to home  Condition at discharge: Stable    Consultations This Hospital Stay   PSYCHIATRY IP CONSULT    Time Spent on this Encounter   I, Mary Parsons, personally saw the patient today and spent greater than 30 minutes discharging this patient.    Discharge Orders     Reason for your hospital stay   Drug overdose     Follow-up and recommended labs and tests    Follow up with primary care provider, Mary Reyes, within 7 days for hospital follow- up.  No follow up labs or test are needed.recommended chemical dependency  Treatment for ongoing oxycodone use .     Activity   Your activity upon discharge: activity as tolerated     Full Code     Diet   Follow this diet upon discharge: Orders Placed This Encounter     Combination Diet Regular  Diet Adult       Discharge Medications   Current Discharge Medication List      CONTINUE these medications which have NOT CHANGED    Details   fluticasone (FLONASE) 50 MCG/ACT spray Spray 1 spray into both nostrils daily      IBUPROFEN PO Take 400-600 mg by mouth every 8 hours as needed       VENTOLIN  (90 BASE) MCG/ACT Inhaler Inhale 2 puffs into the lungs every 6 hours as needed for shortness of breath / dyspnea or wheezing  Qty: 1 Inhaler, Refills: 3    Associated Diagnoses: Reactive airway disease, mild intermittent, uncomplicated         STOP taking these medications       albuterol (PROVENTIL) (5 MG/ML) 0.5% nebulizer solution Comments:   Reason for Stopping:         hydrOXYzine (ATARAX) 25 MG tablet Comments:   Reason for Stopping:             Allergies   Allergies   Allergen Reactions     Seasonal Allergies      Azithromycin Rash     Data   Most Recent 3 CBC's:  Recent Labs   Lab Test  06/07/18   0620  06/06/18   1734   WBC  6.0  6.5   HGB  11.4*  13.6   MCV  84  86   PLT  161  191      Most Recent 3 BMP's:  Recent Labs   Lab Test  06/07/18   0620  06/06/18   1734  04/06/16   1007   NA  141  137  138   POTASSIUM  3.6  3.6  4.1   CHLORIDE  106  101  103   CO2  28  24  26   BUN  10  14  22   CR  0.86  1.18  0.90   ANIONGAP  7  12  9   FLOR  8.1*  8.0*  9.4   GLC  79  378*  94     Most Recent 2 LFT's:  Recent Labs   Lab Test  06/07/18   0620  06/06/18   1734   AST  33  67*   ALT  42  53   ALKPHOS  50  67   BILITOTAL  0.4  0.3     Most Recent INR's and Anticoagulation Dosing History:  Anticoagulation Dose History     Recent Dosing and Labs Latest Ref Rng & Units 6/7/2018    INR 0.86 - 1.14 1.09        Most Recent 3 Troponin's:No lab results found.  Most Recent Cholesterol Panel:No lab results found.  Most Recent 6 Bacteria Isolates From Any Culture (See EPIC Reports for Culture Details):  Recent Labs   Lab Test  04/26/17   1249  02/12/15   1142   CULT  No growth  No Beta Streptococcus isolated     Most  Recent TSH, T4 and A1c Labs:  Recent Labs   Lab Test  04/06/16   1007   TSH  0.93     Results for orders placed or performed during the hospital encounter of 06/06/18   Head CT w/o contrast    Narrative    CT HEAD W/O CONTRAST  6/6/2018 6:12 PM    HISTORY: Mental status change. Amnesia.    TECHNIQUE: Scans were obtained through the head without IV contrast.   Radiation dose for this scan was reduced using automated exposure  control, adjustment of the mA and/or kV according to patient size, or  iterative reconstruction technique.    COMPARISON: None.    FINDINGS: No hemorrhage, mass lesion, or focal area of acute  infarction identified. Paranasal sinuses are normal. No bony  abnormality.      Impression    IMPRESSION: Negative CT scan of the head.    GAGE ONTIVEROS MD

## 2018-06-11 ENCOUNTER — TELEPHONE (OUTPATIENT)
Dept: FAMILY MEDICINE | Facility: CLINIC | Age: 28
End: 2018-06-11

## 2018-06-11 NOTE — TELEPHONE ENCOUNTER
Pt was DC'd from  SD on 6/8/2018 after being treated for Accidental Drug Overdose, Initial Encounter.  Next scheduled appt with PCP is not scheduled.  Please call patient.  Thank you!  Julissa Gabriel

## 2018-06-13 ENCOUNTER — PATIENT OUTREACH (OUTPATIENT)
Dept: CARE COORDINATION | Facility: CLINIC | Age: 28
End: 2018-06-13

## 2018-06-13 NOTE — LETTER
[place] CARE COORDINATION    Chata 15, 2018    Matthew Bone  [address]  [facility] [address]      Dear Matthew,    I am a clinic care coordinator who works with Mary Reyes MD at the [facility]. I have been recently trying to get a hold of you but have been unable to reach you. I wanted to introduce myself and provide you with my contact information so that you can call me with questions or concerns about your health care. Below is a description of clinic care coordination and how I can further assist you.     The clinic care coordinator is a registered nurse and/or  who understand the health care system. The goal of clinic care coordination is to help you manage your health and improve access to the [facility] system in the most efficient manner. The registered nurse can assist you in meeting your health care goals by providing education, coordinating services, and strengthening the communication among your providers. The  can assist you with financial, behavioral, psychosocial, chemical dependency, counseling, and/or psychiatric resources.    Please feel free to contact me at [phone], with any questions or concerns. We at [facility] are focused on providing you with the highest-quality healthcare experience possible and that all starts with you.     Sincerely,     Isa Jade

## 2018-06-13 NOTE — PROGRESS NOTES
Clinic Care Coordination Contact  [facility]/Voicemail       Clinical Data: Care Coordinator Outreach - Patient was recently hospitalized for an accidental overdose. He was d/c'ed home with his wife. He had declined any chemical dependency treatment or services.   Outreach attempted x 1.  Left message on voicemail with call back information and requested return call.  Plan: Care Coordinator will mail out care coordination introduction letter with care coordinator contact information and explanation of care coordination services. Care Coordinator will try to reach patient again in 1-2 business days.    Isa Jade [facility]  Social Work Care Coordinator   [facility]  [phone]

## 2018-06-13 NOTE — TELEPHONE ENCOUNTER
Clinic Care Coordination Contact  [facility]/Voicemail    Clinical Data: Care Coordinator Outreach due to recent hospitalization for accidental drug overdose  Outreach attempted x 1.  Left message on voicemail with call back information and requested return call.  Plan: Care Coordinator will try to reach patient again in 1-2 business days.    Isa Jade, [facility]  Social Work Care Coordinator   [facility]  [phone]

## 2018-06-15 NOTE — PROGRESS NOTES
Clinic Care Coordination Contact  [facility]/Voicemail         Clinical Data: Care Coordinator Outreach - Patient was recently hospitalized for an accidental overdose. He was d/c'ed home with his wife. He had declined any chemical dependency treatment or services.   Outreach attempted x 2.  Left message on voicemail with call back information and requested return call.  Plan: Care Coordinator will mail out care coordination introduction letter with care coordinator contact information and explanation of care coordination services. Care Coordinator will wait 1 week for patient response. If no response, SW will close patient to CC services.      Isa Jade, [facility]  Social Work Care Coordinator   [facility]  [phone]

## 2018-06-22 NOTE — PROGRESS NOTES
Clinic Care Coordination Contact    Situation: Patient chart reviewed by care coordinator.    SW has outreached 2x and left vm messages. SW sent out Care Coordination introduction letter. Patient has not responded to outreaches. At this time SW will do no further FU. Patient can be referred back to CC in the future if any needs arise.    Isa Jade, [facility]  Social Work Care Coordinator   [facility]  [phone]

## 2019-04-12 ENCOUNTER — TELEPHONE (OUTPATIENT)
Dept: FAMILY MEDICINE | Facility: CLINIC | Age: 29
End: 2019-04-12

## 2019-04-12 ENCOUNTER — NURSE TRIAGE (OUTPATIENT)
Dept: NURSING | Facility: CLINIC | Age: 29
End: 2019-04-12

## 2019-04-12 NOTE — TELEPHONE ENCOUNTER
Dr Reyes, The medical examiner is sorry to let us know that Matthew was found  in his home.  Autopsy being done for drug overdose. If you have questions you can call - Investigator Taylor Pringle RN- Triage [employer]

## 2020-03-02 ENCOUNTER — HEALTH MAINTENANCE LETTER (OUTPATIENT)
Age: 30
End: 2020-03-02

## 2020-12-20 ENCOUNTER — HEALTH MAINTENANCE LETTER (OUTPATIENT)
Age: 30
End: 2020-12-20

## 2021-04-18 ENCOUNTER — HEALTH MAINTENANCE LETTER (OUTPATIENT)
Age: 31
End: 2021-04-18

## 2021-10-03 ENCOUNTER — HEALTH MAINTENANCE LETTER (OUTPATIENT)
Age: 31
End: 2021-10-03

## 2022-05-15 ENCOUNTER — HEALTH MAINTENANCE LETTER (OUTPATIENT)
Age: 32
End: 2022-05-15

## 2022-09-10 ENCOUNTER — HEALTH MAINTENANCE LETTER (OUTPATIENT)
Age: 32
End: 2022-09-10

## 2022-11-25 NOTE — TELEPHONE ENCOUNTER
Medical Examiner is calling [facility] gave [facility] phone number.     PROVIDER:[TOKEN:[1972:MIIS:[id number]],FOLLOWUP:[1 week]]

## 2023-06-03 ENCOUNTER — HEALTH MAINTENANCE LETTER (OUTPATIENT)
Age: 33
End: 2023-06-03